# Patient Record
Sex: FEMALE | Race: WHITE | HISPANIC OR LATINO | Employment: UNEMPLOYED | URBAN - METROPOLITAN AREA
[De-identification: names, ages, dates, MRNs, and addresses within clinical notes are randomized per-mention and may not be internally consistent; named-entity substitution may affect disease eponyms.]

---
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
## 2021-10-18 ENCOUNTER — HOSPITAL ENCOUNTER (OUTPATIENT)
Facility: MEDICAL CENTER | Age: 21
End: 2021-10-18
Attending: SPECIALIST
Payer: COMMERCIAL

## 2021-10-18 LAB — FIBRONECTIN FETAL SPEC QL: POSITIVE

## 2021-10-18 PROCEDURE — 82731 ASSAY OF FETAL FIBRONECTIN: CPT

## 2021-10-20 ENCOUNTER — HOSPITAL ENCOUNTER (EMERGENCY)
Facility: MEDICAL CENTER | Age: 21
End: 2021-10-20
Attending: SPECIALIST | Admitting: SPECIALIST
Payer: COMMERCIAL

## 2021-10-20 VITALS
HEART RATE: 80 BPM | BODY MASS INDEX: 27.31 KG/M2 | TEMPERATURE: 98.2 F | RESPIRATION RATE: 16 BRPM | OXYGEN SATURATION: 95 % | DIASTOLIC BLOOD PRESSURE: 64 MMHG | WEIGHT: 160 LBS | HEIGHT: 64 IN | SYSTOLIC BLOOD PRESSURE: 103 MMHG

## 2021-10-20 PROCEDURE — 302449 STATCHG TRIAGE ONLY (STATISTIC)

## 2021-10-20 PROCEDURE — 700111 HCHG RX REV CODE 636 W/ 250 OVERRIDE (IP): Performed by: SPECIALIST

## 2021-10-20 PROCEDURE — 96372 THER/PROPH/DIAG INJ SC/IM: CPT

## 2021-10-20 RX ORDER — BETAMETHASONE SODIUM PHOSPHATE AND BETAMETHASONE ACETATE 3; 3 MG/ML; MG/ML
12 INJECTION, SUSPENSION INTRA-ARTICULAR; INTRALESIONAL; INTRAMUSCULAR; SOFT TISSUE EVERY 24 HOURS
Status: DISCONTINUED | OUTPATIENT
Start: 2021-10-20 | End: 2021-10-20 | Stop reason: HOSPADM

## 2021-10-20 RX ADMIN — BETAMETHASONE SODIUM PHOSPHATE AND BETAMETHASONE ACETATE 12 MG: 3; 3 INJECTION, SUSPENSION INTRA-ARTICULAR; INTRALESIONAL; INTRAMUSCULAR at 17:28

## 2021-10-20 ASSESSMENT — PAIN SCALES - GENERAL: PAINLEVEL: 3

## 2021-10-20 NOTE — PROGRESS NOTES
Pt presents from Dr. Connor's office for betamethasone injection. Pt ambulated to [facility] for assessment.      TOCO and EFM applied, VSS. Pt reports some mild/moderate cramping and reports that her belly gets hard from time to time. This has been going on for a month. Difficult to trace baby due to FM heard by RN, RN will reposition for better assessment. Pt did have a positive FFN on the .     173 Dr. Connor updated on pt status, orders for discharge received. Pt to return tomorrow for second dose. Pt to be placed on pelvic rest due to recently diagnosed marginal placenta previa and r/o PTL.     1745 RN at bedside,  labor precautions as well as instruction on pelvic rest and when to return to L&D. Pt verbalized understanding. Pt informed of second steroid shot tomorrow. All questions answered.     1748 Pt discharged home in stable condition.

## 2021-10-21 ENCOUNTER — APPOINTMENT (OUTPATIENT)
Dept: OBGYN | Facility: MEDICAL CENTER | Age: 21
End: 2021-10-21
Attending: SPECIALIST
Payer: COMMERCIAL

## 2021-10-21 ENCOUNTER — HOSPITAL ENCOUNTER (OUTPATIENT)
Facility: MEDICAL CENTER | Age: 21
End: 2021-10-21
Attending: SPECIALIST | Admitting: SPECIALIST
Payer: COMMERCIAL

## 2021-10-21 VITALS
WEIGHT: 160 LBS | TEMPERATURE: 98.4 F | OXYGEN SATURATION: 95 % | DIASTOLIC BLOOD PRESSURE: 57 MMHG | HEIGHT: 64 IN | BODY MASS INDEX: 27.31 KG/M2 | SYSTOLIC BLOOD PRESSURE: 105 MMHG | RESPIRATION RATE: 16 BRPM | HEART RATE: 100 BPM

## 2021-10-21 PROCEDURE — 700111 HCHG RX REV CODE 636 W/ 250 OVERRIDE (IP): Performed by: SPECIALIST

## 2021-10-21 PROCEDURE — 96372 THER/PROPH/DIAG INJ SC/IM: CPT

## 2021-10-21 RX ORDER — BETAMETHASONE SODIUM PHOSPHATE AND BETAMETHASONE ACETATE 3; 3 MG/ML; MG/ML
12 INJECTION, SUSPENSION INTRA-ARTICULAR; INTRALESIONAL; INTRAMUSCULAR; SOFT TISSUE EVERY 24 HOURS
Status: COMPLETED | OUTPATIENT
Start: 2021-10-21 | End: 2021-10-21

## 2021-10-21 RX ADMIN — BETAMETHASONE SODIUM PHOSPHATE AND BETAMETHASONE ACETATE 12 MG: 3; 3 INJECTION, SUSPENSION INTRA-ARTICULAR; INTRALESIONAL; INTRAMUSCULAR at 17:56

## 2021-10-21 NOTE — DISCHARGE INSTRUCTIONS
General Instructions:  · If you think you are in labor, time contractions (lying on your left side) from the beginning of one contraction to the beginning of the next contraction for at least one hour.  · Increase fluid intake: you should consume 10-12 8 oz glasses of non-caffeinated fluid per day.  · Report any pressure or burning on urination to your physician.  · Monitor fetal movement: If you notice an absence or decrease in fetal movement, drink a large glass of water and rest on your side.  If there is no increase in movement, call your physician or go to the hospital for further evaluation.  · Report any sudden, sharp abdominal pain.  · Report any bleeding.  Spotting or pinkish discharge is normal after vaginal exam.  You may also spot after sexual intercourse.    Pre-term Labor (<37 weeks):  Call your physician or return to the hospital if:  · You have painless regular contractions more than 4 in one hour.  · Your water breaks (remember time and color).  · You have menstrual-like cramps, a low dull backache or pressure in your pelvis or back.  · Your baby does not move enough to complete the daily kick count (10 movements in 2 hours).  · Your baby moves much less often than on the days before or you have not felt your baby move all day.  · Please review the MEDICATION LIST section of your AFTER VISIT SUMMARY document.  · Take your medication as prescribed      Activity Restriction During Pregnancy  Your health care provider may recommend specific activity restrictions during pregnancy for a variety of reasons. Activity restriction may require that you limit activities that require great effort, such as exercise, lifting, or sex.  The type of activity restriction will vary for each person, depending on your risk or the problems you are having. Activity restriction may be recommended for a period of time until your baby is delivered.  Why are activity restrictions recommended?  Activity restriction may be  recommended if:  · Your placenta is partially or completely covering the opening of your cervix (placenta previa).  · There is bleeding between the wall of the uterus and the amniotic sac in the first trimester of pregnancy (subchorionic hemorrhage).  · You went into labor too early ( labor).  · You have a history of miscarriage.  · You have a condition that causes high blood pressure during pregnancy (preeclampsia or eclampsia).  · You are pregnant with more than one baby.  · Your baby is not growing well.  What are the risks?  The risks depend on your specific restriction. Strict bed rest has the most physical and emotional risks and is no longer routinely recommended. Risks of strict bed rest include:  · Loss of muscle conditioning from not moving.  · Blood clots.  · Social isolation.  · Depression.  · Loss of income.  Talk with your health care team about activity restriction to decide if it is best for you and your baby. Even if you are having problems during your pregnancy, you may be able to continue with normal levels of activity with careful monitoring by your health care team.  Follow these instructions at home:  If needed, based on your overall health and the health of your baby, your health care provider will decide which type of activity restriction is right for you. Activity restrictions may include:  · Not lifting anything heavier than 10 pounds (4.5 kg).  · Avoiding activities that take a lot of physical effort.  · No lifting or straining.  · Resting in a sitting position or lying down for periods of time during the day.  Pelvic rest may be recommended along with activity restrictions. If pelvic rest is recommended, then:  · Do not have sex, an orgasm, or use sexual stimulation.  · Do not use tampons. Do not douche. Do not put anything into your vagina.  · Do not lift anything that is heavier than 10 lb (4.5 kg).  · Avoid activities that require a lot of effort.  · Avoid any activity in which  your pelvic muscles could become strained, such as squatting.  Questions to ask your health care provider  · Why is my activity being limited?  · How will activity restrictions affect my body?  · Why is rest helpful for me and my baby?  · What activities can I do?  · When can I return to normal activities?  When should I seek immediate medical care?  Seek immediate medical care if you have:  · Vaginal bleeding.  · Vaginal discharge.  · Cramping pain in your lower abdomen.  · Regular contractions.  · A low, dull backache.  Summary  · Your health care provider may recommend specific activity restrictions during pregnancy for a variety of reasons.  · Activity restriction may require that you limit activities such as exercise, lifting, sex, or any other activity that requires great effort.  · Discuss the risks and benefits of activity restriction with your health care team to decide if it is best for you and your baby.  · Contact your health care provider right away if you think you are having contractions, or if you notice vaginal bleeding, discharge, or cramping.  This information is not intended to replace advice given to you by your health care provider. Make sure you discuss any questions you have with your health care provider.  Document Released: 04/13/2012 Document Revised: 04/09/2019 Document Reviewed: 04/09/2019  Elsevier Patient Education © 2020 Elsevier Inc.      Other Instructions:  Please carefully review your entire AFTER VISIT SUMMARY document for all discharge instructions.

## 2021-11-11 ENCOUNTER — HOSPITAL ENCOUNTER (EMERGENCY)
Facility: MEDICAL CENTER | Age: 21
End: 2021-11-11
Payer: COMMERCIAL

## 2021-11-11 ENCOUNTER — HOSPITAL ENCOUNTER (INPATIENT)
Facility: MEDICAL CENTER | Age: 21
LOS: 3 days | End: 2021-11-14
Attending: SPECIALIST | Admitting: SPECIALIST
Payer: COMMERCIAL

## 2021-11-11 ENCOUNTER — ANESTHESIA (OUTPATIENT)
Dept: OBGYN | Facility: MEDICAL CENTER | Age: 21
End: 2021-11-11
Payer: COMMERCIAL

## 2021-11-11 ENCOUNTER — APPOINTMENT (OUTPATIENT)
Dept: RADIOLOGY | Facility: MEDICAL CENTER | Age: 21
End: 2021-11-11
Attending: SPECIALIST
Payer: COMMERCIAL

## 2021-11-11 ENCOUNTER — ANESTHESIA EVENT (OUTPATIENT)
Dept: OBGYN | Facility: MEDICAL CENTER | Age: 21
End: 2021-11-11
Payer: COMMERCIAL

## 2021-11-11 VITALS
DIASTOLIC BLOOD PRESSURE: 85 MMHG | HEART RATE: 118 BPM | RESPIRATION RATE: 18 BRPM | OXYGEN SATURATION: 96 % | SYSTOLIC BLOOD PRESSURE: 108 MMHG | TEMPERATURE: 98.1 F

## 2021-11-11 LAB
ABO + RH BLD: NORMAL
ABO GROUP BLD: NORMAL
APTT PPP: 28.1 SEC (ref 24.7–36)
BARCODED ABORH UBTYP: 9500
BARCODED PRD CODE UBPRD: NORMAL
BARCODED UNIT NUM UBUNT: NORMAL
BASOPHILS # BLD AUTO: 0.3 % (ref 0–1.8)
BASOPHILS # BLD AUTO: 0.6 % (ref 0–1.8)
BASOPHILS # BLD: 0.05 K/UL (ref 0–0.12)
BASOPHILS # BLD: 0.07 K/UL (ref 0–0.12)
BLD GP AB SCN SERPL QL: NORMAL
COMPONENT R 8504R: NORMAL
EOSINOPHIL # BLD AUTO: 0.03 K/UL (ref 0–0.51)
EOSINOPHIL # BLD AUTO: 0.08 K/UL (ref 0–0.51)
EOSINOPHIL NFR BLD: 0.2 % (ref 0–6.9)
EOSINOPHIL NFR BLD: 0.7 % (ref 0–6.9)
ERYTHROCYTE [DISTWIDTH] IN BLOOD BY AUTOMATED COUNT: 40.7 FL (ref 35.9–50)
ERYTHROCYTE [DISTWIDTH] IN BLOOD BY AUTOMATED COUNT: 42.2 FL (ref 35.9–50)
ERYTHROCYTE [DISTWIDTH] IN BLOOD BY AUTOMATED COUNT: 42.4 FL (ref 35.9–50)
FIBRINOGEN PPP-MCNC: 442 MG/DL (ref 215–460)
HCT VFR BLD AUTO: 31.5 % (ref 37–47)
HCT VFR BLD AUTO: 34.3 % (ref 37–47)
HCT VFR BLD AUTO: 36 % (ref 37–47)
HGB BLD-MCNC: 10.9 G/DL (ref 12–16)
HGB BLD-MCNC: 11.8 G/DL (ref 12–16)
HGB BLD-MCNC: 12.3 G/DL (ref 12–16)
IMM GRANULOCYTES # BLD AUTO: 0.05 K/UL (ref 0–0.11)
IMM GRANULOCYTES # BLD AUTO: 0.14 K/UL (ref 0–0.11)
IMM GRANULOCYTES NFR BLD AUTO: 0.4 % (ref 0–0.9)
IMM GRANULOCYTES NFR BLD AUTO: 0.8 % (ref 0–0.9)
INR PPP: 1.01 (ref 0.87–1.13)
LYMPHOCYTES # BLD AUTO: 1.98 K/UL (ref 1–4.8)
LYMPHOCYTES # BLD AUTO: 4.09 K/UL (ref 1–4.8)
LYMPHOCYTES NFR BLD: 11 % (ref 22–41)
LYMPHOCYTES NFR BLD: 33.8 % (ref 22–41)
MCH RBC QN AUTO: 30.2 PG (ref 27–33)
MCH RBC QN AUTO: 30.5 PG (ref 27–33)
MCH RBC QN AUTO: 30.6 PG (ref 27–33)
MCHC RBC AUTO-ENTMCNC: 34.2 G/DL (ref 33.6–35)
MCHC RBC AUTO-ENTMCNC: 34.4 G/DL (ref 33.6–35)
MCHC RBC AUTO-ENTMCNC: 34.6 G/DL (ref 33.6–35)
MCV RBC AUTO: 88.2 FL (ref 81.4–97.8)
MCV RBC AUTO: 88.5 FL (ref 81.4–97.8)
MCV RBC AUTO: 89.1 FL (ref 81.4–97.8)
MONOCYTES # BLD AUTO: 0.58 K/UL (ref 0–0.85)
MONOCYTES # BLD AUTO: 0.72 K/UL (ref 0–0.85)
MONOCYTES NFR BLD AUTO: 3.2 % (ref 0–13.4)
MONOCYTES NFR BLD AUTO: 6 % (ref 0–13.4)
NEUTROPHILS # BLD AUTO: 15.29 K/UL (ref 2–7.15)
NEUTROPHILS # BLD AUTO: 7.09 K/UL (ref 2–7.15)
NEUTROPHILS NFR BLD: 58.5 % (ref 44–72)
NEUTROPHILS NFR BLD: 84.5 % (ref 44–72)
NRBC # BLD AUTO: 0 K/UL
NRBC # BLD AUTO: 0 K/UL
NRBC BLD-RTO: 0 /100 WBC
NRBC BLD-RTO: 0 /100 WBC
PATHOLOGY CONSULT NOTE: NORMAL
PLATELET # BLD AUTO: 219 K/UL (ref 164–446)
PLATELET # BLD AUTO: 255 K/UL (ref 164–446)
PLATELET # BLD AUTO: 266 K/UL (ref 164–446)
PMV BLD AUTO: 10 FL (ref 9–12.9)
PMV BLD AUTO: 10 FL (ref 9–12.9)
PMV BLD AUTO: 9.9 FL (ref 9–12.9)
PRODUCT TYPE UPROD: NORMAL
PROTHROMBIN TIME: 13 SEC (ref 12–14.6)
RBC # BLD AUTO: 3.57 M/UL (ref 4.2–5.4)
RBC # BLD AUTO: 3.85 M/UL (ref 4.2–5.4)
RBC # BLD AUTO: 4.07 M/UL (ref 4.2–5.4)
RH BLD: NORMAL
SARS-COV+SARS-COV-2 AG RESP QL IA.RAPID: NOTDETECTED
SPECIMEN SOURCE: NORMAL
UNIT STATUS USTAT: NORMAL
WBC # BLD AUTO: 12.1 K/UL (ref 4.8–10.8)
WBC # BLD AUTO: 18 K/UL (ref 4.8–10.8)
WBC # BLD AUTO: 18.1 K/UL (ref 4.8–10.8)

## 2021-11-11 PROCEDURE — 86900 BLOOD TYPING SEROLOGIC ABO: CPT

## 2021-11-11 PROCEDURE — 36415 COLL VENOUS BLD VENIPUNCTURE: CPT

## 2021-11-11 PROCEDURE — 770002 HCHG ROOM/CARE - OB PRIVATE (112)

## 2021-11-11 PROCEDURE — P9016 RBC LEUKOCYTES REDUCED: HCPCS

## 2021-11-11 PROCEDURE — 36430 TRANSFUSION BLD/BLD COMPNT: CPT

## 2021-11-11 PROCEDURE — 700111 HCHG RX REV CODE 636 W/ 250 OVERRIDE (IP): Performed by: ANESTHESIOLOGY

## 2021-11-11 PROCEDURE — 85610 PROTHROMBIN TIME: CPT

## 2021-11-11 PROCEDURE — 85027 COMPLETE CBC AUTOMATED: CPT

## 2021-11-11 PROCEDURE — 76815 OB US LIMITED FETUS(S): CPT

## 2021-11-11 PROCEDURE — 160009 HCHG ANES TIME/MIN: Performed by: SPECIALIST

## 2021-11-11 PROCEDURE — 160041 HCHG SURGERY MINUTES - EA ADDL 1 MIN LEVEL 4: Performed by: SPECIALIST

## 2021-11-11 PROCEDURE — 85025 COMPLETE CBC W/AUTO DIFF WBC: CPT

## 2021-11-11 PROCEDURE — 302449 STATCHG TRIAGE ONLY (STATISTIC): Mod: EDC

## 2021-11-11 PROCEDURE — 700102 HCHG RX REV CODE 250 W/ 637 OVERRIDE(OP): Performed by: ANESTHESIOLOGY

## 2021-11-11 PROCEDURE — 700105 HCHG RX REV CODE 258: Performed by: ANESTHESIOLOGY

## 2021-11-11 PROCEDURE — A9270 NON-COVERED ITEM OR SERVICE: HCPCS | Performed by: SPECIALIST

## 2021-11-11 PROCEDURE — 160035 HCHG PACU - 1ST 60 MINS PHASE I: Performed by: SPECIALIST

## 2021-11-11 PROCEDURE — 160002 HCHG RECOVERY MINUTES (STAT): Performed by: SPECIALIST

## 2021-11-11 PROCEDURE — 64488 TAP BLOCK BI INJECTION: CPT | Performed by: SPECIALIST

## 2021-11-11 PROCEDURE — 85730 THROMBOPLASTIN TIME PARTIAL: CPT

## 2021-11-11 PROCEDURE — 160048 HCHG OR STATISTICAL LEVEL 1-5: Performed by: SPECIALIST

## 2021-11-11 PROCEDURE — 160029 HCHG SURGERY MINUTES - 1ST 30 MINS LEVEL 4: Performed by: SPECIALIST

## 2021-11-11 PROCEDURE — A9270 NON-COVERED ITEM OR SERVICE: HCPCS

## 2021-11-11 PROCEDURE — 86901 BLOOD TYPING SEROLOGIC RH(D): CPT

## 2021-11-11 PROCEDURE — 700102 HCHG RX REV CODE 250 W/ 637 OVERRIDE(OP)

## 2021-11-11 PROCEDURE — 700111 HCHG RX REV CODE 636 W/ 250 OVERRIDE (IP)

## 2021-11-11 PROCEDURE — 88305 TISSUE EXAM BY PATHOLOGIST: CPT

## 2021-11-11 PROCEDURE — 700102 HCHG RX REV CODE 250 W/ 637 OVERRIDE(OP): Performed by: SPECIALIST

## 2021-11-11 PROCEDURE — 3E0T3BZ INTRODUCTION OF ANESTHETIC AGENT INTO PERIPHERAL NERVES AND PLEXI, PERCUTANEOUS APPROACH: ICD-10-PCS | Performed by: ANESTHESIOLOGY

## 2021-11-11 PROCEDURE — 700111 HCHG RX REV CODE 636 W/ 250 OVERRIDE (IP): Performed by: SPECIALIST

## 2021-11-11 PROCEDURE — A9270 NON-COVERED ITEM OR SERVICE: HCPCS | Performed by: ANESTHESIOLOGY

## 2021-11-11 PROCEDURE — 87426 SARSCOV CORONAVIRUS AG IA: CPT

## 2021-11-11 PROCEDURE — 86850 RBC ANTIBODY SCREEN: CPT

## 2021-11-11 PROCEDURE — 700101 HCHG RX REV CODE 250: Performed by: ANESTHESIOLOGY

## 2021-11-11 PROCEDURE — 30233N1 TRANSFUSION OF NONAUTOLOGOUS RED BLOOD CELLS INTO PERIPHERAL VEIN, PERCUTANEOUS APPROACH: ICD-10-PCS | Performed by: SPECIALIST

## 2021-11-11 PROCEDURE — 96375 TX/PRO/DX INJ NEW DRUG ADDON: CPT

## 2021-11-11 PROCEDURE — 85384 FIBRINOGEN ACTIVITY: CPT

## 2021-11-11 PROCEDURE — 96374 THER/PROPH/DIAG INJ IV PUSH: CPT

## 2021-11-11 PROCEDURE — 99285 EMERGENCY DEPT VISIT HI MDM: CPT

## 2021-11-11 RX ORDER — MEPERIDINE HYDROCHLORIDE 25 MG/ML
12.5 INJECTION INTRAMUSCULAR; INTRAVENOUS; SUBCUTANEOUS
Status: DISCONTINUED | OUTPATIENT
Start: 2021-11-11 | End: 2021-11-11 | Stop reason: HOSPADM

## 2021-11-11 RX ORDER — MAGNESIUM SULFATE HEPTAHYDRATE 40 MG/ML
1 INJECTION, SOLUTION INTRAVENOUS CONTINUOUS
Status: DISCONTINUED | OUTPATIENT
Start: 2021-11-11 | End: 2021-11-11

## 2021-11-11 RX ORDER — METHYLERGONOVINE MALEATE 0.2 MG/ML
INJECTION INTRAVENOUS
Status: COMPLETED
Start: 2021-11-11 | End: 2021-11-11

## 2021-11-11 RX ORDER — HYDROCODONE BITARTRATE AND ACETAMINOPHEN 5; 325 MG/1; MG/1
1 TABLET ORAL EVERY 4 HOURS PRN
Status: DISCONTINUED | OUTPATIENT
Start: 2021-11-11 | End: 2021-11-14 | Stop reason: HOSPADM

## 2021-11-11 RX ORDER — MAGNESIUM SULFATE HEPTAHYDRATE 40 MG/ML
4 INJECTION, SOLUTION INTRAVENOUS ONCE
Status: COMPLETED | OUTPATIENT
Start: 2021-11-11 | End: 2021-11-11

## 2021-11-11 RX ORDER — ROCURONIUM BROMIDE 10 MG/ML
INJECTION, SOLUTION INTRAVENOUS PRN
Status: DISCONTINUED | OUTPATIENT
Start: 2021-11-11 | End: 2021-11-11 | Stop reason: SURG

## 2021-11-11 RX ORDER — METHYLERGONOVINE MALEATE 0.2 MG/ML
0.2 INJECTION INTRAVENOUS
Status: DISCONTINUED | OUTPATIENT
Start: 2021-11-11 | End: 2021-11-14 | Stop reason: HOSPADM

## 2021-11-11 RX ORDER — SODIUM CHLORIDE, SODIUM LACTATE, POTASSIUM CHLORIDE, CALCIUM CHLORIDE 600; 310; 30; 20 MG/100ML; MG/100ML; MG/100ML; MG/100ML
INJECTION, SOLUTION INTRAVENOUS CONTINUOUS
Status: DISCONTINUED | OUTPATIENT
Start: 2021-11-11 | End: 2021-11-11

## 2021-11-11 RX ORDER — OXYTOCIN 10 [USP'U]/ML
INJECTION, SOLUTION INTRAMUSCULAR; INTRAVENOUS PRN
Status: DISCONTINUED | OUTPATIENT
Start: 2021-11-11 | End: 2021-11-11 | Stop reason: SURG

## 2021-11-11 RX ORDER — PHENYLEPHRINE HCL IN 0.9% NACL 0.5 MG/5ML
SYRINGE (ML) INTRAVENOUS PRN
Status: DISCONTINUED | OUTPATIENT
Start: 2021-11-11 | End: 2021-11-11 | Stop reason: SURG

## 2021-11-11 RX ORDER — CITRIC ACID/SODIUM CITRATE 334-500MG
30 SOLUTION, ORAL ORAL ONCE
Status: DISCONTINUED | OUTPATIENT
Start: 2021-11-11 | End: 2021-11-11 | Stop reason: HOSPADM

## 2021-11-11 RX ORDER — LIDOCAINE HYDROCHLORIDE 20 MG/ML
INJECTION, SOLUTION EPIDURAL; INFILTRATION; INTRACAUDAL; PERINEURAL PRN
Status: DISCONTINUED | OUTPATIENT
Start: 2021-11-11 | End: 2021-11-11 | Stop reason: SURG

## 2021-11-11 RX ORDER — VITAMIN A ACETATE, BETA CAROTENE, ASCORBIC ACID, CHOLECALCIFEROL, .ALPHA.-TOCOPHEROL ACETATE, DL-, THIAMINE MONONITRATE, RIBOFLAVIN, NIACINAMIDE, PYRIDOXINE HYDROCHLORIDE, FOLIC ACID, CYANOCOBALAMIN, CALCIUM CARBONATE, FERROUS FUMARATE, ZINC OXIDE, CUPRIC OXIDE 3080; 12; 120; 400; 1; 1.84; 3; 20; 22; 920; 25; 200; 27; 10; 2 [IU]/1; UG/1; MG/1; [IU]/1; MG/1; MG/1; MG/1; MG/1; MG/1; [IU]/1; MG/1; MG/1; MG/1; MG/1; MG/1
1 TABLET, FILM COATED ORAL
Status: DISCONTINUED | OUTPATIENT
Start: 2021-11-11 | End: 2021-11-11

## 2021-11-11 RX ORDER — CEFAZOLIN SODIUM 1 G/3ML
INJECTION, POWDER, FOR SOLUTION INTRAMUSCULAR; INTRAVENOUS PRN
Status: DISCONTINUED | OUTPATIENT
Start: 2021-11-11 | End: 2021-11-11 | Stop reason: SURG

## 2021-11-11 RX ORDER — DIPHENHYDRAMINE HYDROCHLORIDE 50 MG/ML
25 INJECTION INTRAMUSCULAR; INTRAVENOUS EVERY 6 HOURS PRN
Status: DISCONTINUED | OUTPATIENT
Start: 2021-11-11 | End: 2021-11-14 | Stop reason: HOSPADM

## 2021-11-11 RX ORDER — SUCCINYLCHOLINE CHLORIDE 20 MG/ML
INJECTION INTRAMUSCULAR; INTRAVENOUS PRN
Status: DISCONTINUED | OUTPATIENT
Start: 2021-11-11 | End: 2021-11-11 | Stop reason: SURG

## 2021-11-11 RX ORDER — ONDANSETRON 4 MG/1
4 TABLET, ORALLY DISINTEGRATING ORAL EVERY 6 HOURS PRN
Status: DISCONTINUED | OUTPATIENT
Start: 2021-11-11 | End: 2021-11-14 | Stop reason: HOSPADM

## 2021-11-11 RX ORDER — HYDROMORPHONE HYDROCHLORIDE 1 MG/ML
INJECTION, SOLUTION INTRAMUSCULAR; INTRAVENOUS; SUBCUTANEOUS PRN
Status: DISCONTINUED | OUTPATIENT
Start: 2021-11-11 | End: 2021-11-11 | Stop reason: SURG

## 2021-11-11 RX ORDER — KETOROLAC TROMETHAMINE 30 MG/ML
INJECTION, SOLUTION INTRAMUSCULAR; INTRAVENOUS PRN
Status: DISCONTINUED | OUTPATIENT
Start: 2021-11-11 | End: 2021-11-11 | Stop reason: SURG

## 2021-11-11 RX ORDER — HALOPERIDOL 5 MG/ML
1 INJECTION INTRAMUSCULAR
Status: DISCONTINUED | OUTPATIENT
Start: 2021-11-11 | End: 2021-11-11 | Stop reason: HOSPADM

## 2021-11-11 RX ORDER — HYDROCODONE BITARTRATE AND ACETAMINOPHEN 10; 325 MG/1; MG/1
1 TABLET ORAL EVERY 4 HOURS PRN
Status: DISCONTINUED | OUTPATIENT
Start: 2021-11-11 | End: 2021-11-14 | Stop reason: HOSPADM

## 2021-11-11 RX ORDER — ONDANSETRON 2 MG/ML
4 INJECTION INTRAMUSCULAR; INTRAVENOUS EVERY 6 HOURS PRN
Status: DISCONTINUED | OUTPATIENT
Start: 2021-11-11 | End: 2021-11-14 | Stop reason: HOSPADM

## 2021-11-11 RX ORDER — ONDANSETRON 2 MG/ML
4 INJECTION INTRAMUSCULAR; INTRAVENOUS EVERY 6 HOURS PRN
Status: DISCONTINUED | OUTPATIENT
Start: 2021-11-11 | End: 2021-11-11

## 2021-11-11 RX ORDER — SODIUM CHLORIDE, SODIUM LACTATE, POTASSIUM CHLORIDE, CALCIUM CHLORIDE 600; 310; 30; 20 MG/100ML; MG/100ML; MG/100ML; MG/100ML
INJECTION, SOLUTION INTRAVENOUS PRN
Status: DISCONTINUED | OUTPATIENT
Start: 2021-11-11 | End: 2021-11-14 | Stop reason: HOSPADM

## 2021-11-11 RX ORDER — HYDROMORPHONE HYDROCHLORIDE 1 MG/ML
0.1 INJECTION, SOLUTION INTRAMUSCULAR; INTRAVENOUS; SUBCUTANEOUS
Status: DISCONTINUED | OUTPATIENT
Start: 2021-11-11 | End: 2021-11-11 | Stop reason: HOSPADM

## 2021-11-11 RX ORDER — DIPHENHYDRAMINE HCL 25 MG
25 TABLET ORAL EVERY 6 HOURS PRN
Status: DISCONTINUED | OUTPATIENT
Start: 2021-11-11 | End: 2021-11-14 | Stop reason: HOSPADM

## 2021-11-11 RX ORDER — IBUPROFEN 600 MG/1
600 TABLET ORAL EVERY 6 HOURS PRN
Status: DISCONTINUED | OUTPATIENT
Start: 2021-11-11 | End: 2021-11-11

## 2021-11-11 RX ORDER — MORPHINE SULFATE 4 MG/ML
4 INJECTION, SOLUTION INTRAMUSCULAR; INTRAVENOUS
Status: DISCONTINUED | OUTPATIENT
Start: 2021-11-11 | End: 2021-11-14 | Stop reason: HOSPADM

## 2021-11-11 RX ORDER — KETOROLAC TROMETHAMINE 30 MG/ML
30 INJECTION, SOLUTION INTRAMUSCULAR; INTRAVENOUS EVERY 6 HOURS
Status: COMPLETED | OUTPATIENT
Start: 2021-11-11 | End: 2021-11-12

## 2021-11-11 RX ORDER — OXYCODONE HCL 5 MG/5 ML
10 SOLUTION, ORAL ORAL
Status: COMPLETED | OUTPATIENT
Start: 2021-11-11 | End: 2021-11-11

## 2021-11-11 RX ORDER — ONDANSETRON 2 MG/ML
4 INJECTION INTRAMUSCULAR; INTRAVENOUS
Status: DISCONTINUED | OUTPATIENT
Start: 2021-11-11 | End: 2021-11-11 | Stop reason: HOSPADM

## 2021-11-11 RX ORDER — BUPIVACAINE HYDROCHLORIDE 2.5 MG/ML
INJECTION, SOLUTION EPIDURAL; INFILTRATION; INTRACAUDAL PRN
Status: DISCONTINUED | OUTPATIENT
Start: 2021-11-11 | End: 2021-11-11 | Stop reason: SURG

## 2021-11-11 RX ORDER — SODIUM CHLORIDE, SODIUM GLUCONATE, SODIUM ACETATE, POTASSIUM CHLORIDE AND MAGNESIUM CHLORIDE 526; 502; 368; 37; 30 MG/100ML; MG/100ML; MG/100ML; MG/100ML; MG/100ML
1500 INJECTION, SOLUTION INTRAVENOUS ONCE
Status: COMPLETED | OUTPATIENT
Start: 2021-11-11 | End: 2021-11-11

## 2021-11-11 RX ORDER — IBUPROFEN 600 MG/1
600 TABLET ORAL EVERY 6 HOURS PRN
Status: DISCONTINUED | OUTPATIENT
Start: 2021-11-12 | End: 2021-11-14 | Stop reason: HOSPADM

## 2021-11-11 RX ORDER — MISOPROSTOL 200 UG/1
600 TABLET ORAL
Status: DISCONTINUED | OUTPATIENT
Start: 2021-11-11 | End: 2021-11-14 | Stop reason: HOSPADM

## 2021-11-11 RX ORDER — METOCLOPRAMIDE HYDROCHLORIDE 5 MG/ML
10 INJECTION INTRAMUSCULAR; INTRAVENOUS ONCE
Status: COMPLETED | OUTPATIENT
Start: 2021-11-11 | End: 2021-11-11

## 2021-11-11 RX ORDER — OXYCODONE HCL 5 MG/5 ML
5 SOLUTION, ORAL ORAL
Status: COMPLETED | OUTPATIENT
Start: 2021-11-11 | End: 2021-11-11

## 2021-11-11 RX ORDER — BISACODYL 10 MG
10 SUPPOSITORY, RECTAL RECTAL PRN
Status: DISCONTINUED | OUTPATIENT
Start: 2021-11-11 | End: 2021-11-14 | Stop reason: HOSPADM

## 2021-11-11 RX ORDER — VITAMIN A ACETATE, BETA CAROTENE, ASCORBIC ACID, CHOLECALCIFEROL, .ALPHA.-TOCOPHEROL ACETATE, DL-, THIAMINE MONONITRATE, RIBOFLAVIN, NIACINAMIDE, PYRIDOXINE HYDROCHLORIDE, FOLIC ACID, CYANOCOBALAMIN, CALCIUM CARBONATE, FERROUS FUMARATE, ZINC OXIDE, CUPRIC OXIDE 3080; 12; 120; 400; 1; 1.84; 3; 20; 22; 920; 25; 200; 27; 10; 2 [IU]/1; UG/1; MG/1; [IU]/1; MG/1; MG/1; MG/1; MG/1; MG/1; [IU]/1; MG/1; MG/1; MG/1; MG/1; MG/1
1 TABLET, FILM COATED ORAL
Status: DISCONTINUED | OUTPATIENT
Start: 2021-11-12 | End: 2021-11-14 | Stop reason: HOSPADM

## 2021-11-11 RX ORDER — SIMETHICONE 80 MG
80 TABLET,CHEWABLE ORAL 4 TIMES DAILY PRN
Status: DISCONTINUED | OUTPATIENT
Start: 2021-11-11 | End: 2021-11-14 | Stop reason: HOSPADM

## 2021-11-11 RX ORDER — MISOPROSTOL 200 UG/1
TABLET ORAL
Status: COMPLETED
Start: 2021-11-11 | End: 2021-11-11

## 2021-11-11 RX ORDER — ACETAMINOPHEN 325 MG/1
325 TABLET ORAL EVERY 4 HOURS PRN
Status: DISCONTINUED | OUTPATIENT
Start: 2021-11-11 | End: 2021-11-14 | Stop reason: HOSPADM

## 2021-11-11 RX ORDER — METHYLERGONOVINE MALEATE 0.2 MG/ML
INJECTION INTRAVENOUS PRN
Status: DISCONTINUED | OUTPATIENT
Start: 2021-11-11 | End: 2021-11-11 | Stop reason: SURG

## 2021-11-11 RX ORDER — DOCUSATE SODIUM 100 MG/1
100 CAPSULE, LIQUID FILLED ORAL 2 TIMES DAILY PRN
Status: DISCONTINUED | OUTPATIENT
Start: 2021-11-11 | End: 2021-11-14 | Stop reason: HOSPADM

## 2021-11-11 RX ORDER — CARBOPROST TROMETHAMINE 250 UG/ML
250 INJECTION, SOLUTION INTRAMUSCULAR
Status: DISCONTINUED | OUTPATIENT
Start: 2021-11-11 | End: 2021-11-14 | Stop reason: HOSPADM

## 2021-11-11 RX ORDER — CITRIC ACID/SODIUM CITRATE 334-500MG
SOLUTION, ORAL ORAL
Status: DISCONTINUED
Start: 2021-11-11 | End: 2021-11-11

## 2021-11-11 RX ORDER — HYDROMORPHONE HYDROCHLORIDE 1 MG/ML
0.2 INJECTION, SOLUTION INTRAMUSCULAR; INTRAVENOUS; SUBCUTANEOUS
Status: DISCONTINUED | OUTPATIENT
Start: 2021-11-11 | End: 2021-11-11 | Stop reason: HOSPADM

## 2021-11-11 RX ORDER — ONDANSETRON 2 MG/ML
INJECTION INTRAMUSCULAR; INTRAVENOUS PRN
Status: DISCONTINUED | OUTPATIENT
Start: 2021-11-11 | End: 2021-11-11 | Stop reason: SURG

## 2021-11-11 RX ORDER — HYDROMORPHONE HYDROCHLORIDE 1 MG/ML
0.4 INJECTION, SOLUTION INTRAMUSCULAR; INTRAVENOUS; SUBCUTANEOUS
Status: DISCONTINUED | OUTPATIENT
Start: 2021-11-11 | End: 2021-11-11 | Stop reason: HOSPADM

## 2021-11-11 RX ORDER — METOCLOPRAMIDE HYDROCHLORIDE 5 MG/ML
10 INJECTION INTRAMUSCULAR; INTRAVENOUS EVERY 6 HOURS PRN
Status: DISCONTINUED | OUTPATIENT
Start: 2021-11-11 | End: 2021-11-14 | Stop reason: HOSPADM

## 2021-11-11 RX ORDER — DEXAMETHASONE SODIUM PHOSPHATE 4 MG/ML
INJECTION, SOLUTION INTRA-ARTICULAR; INTRALESIONAL; INTRAMUSCULAR; INTRAVENOUS; SOFT TISSUE PRN
Status: DISCONTINUED | OUTPATIENT
Start: 2021-11-11 | End: 2021-11-11 | Stop reason: SURG

## 2021-11-11 RX ADMIN — MISOPROSTOL 1000 MCG: 200 TABLET ORAL at 09:00

## 2021-11-11 RX ADMIN — PROPOFOL 200 MG: 10 INJECTION, EMULSION INTRAVENOUS at 08:16

## 2021-11-11 RX ADMIN — FENTANYL CITRATE 50 MCG: 50 INJECTION, SOLUTION INTRAMUSCULAR; INTRAVENOUS at 08:40

## 2021-11-11 RX ADMIN — SODIUM CHLORIDE, SODIUM GLUCONATE, SODIUM ACETATE, POTASSIUM CHLORIDE AND MAGNESIUM CHLORIDE: 526; 502; 368; 37; 30 INJECTION, SOLUTION INTRAVENOUS at 08:11

## 2021-11-11 RX ADMIN — Medication 200 MCG: at 08:28

## 2021-11-11 RX ADMIN — SUCCINYLCHOLINE CHLORIDE 100 MG: 20 INJECTION, SOLUTION INTRAMUSCULAR; INTRAVENOUS at 08:16

## 2021-11-11 RX ADMIN — MAGNESIUM SULFATE HEPTAHYDRATE 4 G: 40 INJECTION, SOLUTION INTRAVENOUS at 07:55

## 2021-11-11 RX ADMIN — Medication 200 MCG: at 08:32

## 2021-11-11 RX ADMIN — HYDROCODONE BITARTRATE AND ACETAMINOPHEN 1 TABLET: 10; 325 TABLET ORAL at 17:23

## 2021-11-11 RX ADMIN — FAMOTIDINE 20 MG: 10 INJECTION INTRAVENOUS at 08:00

## 2021-11-11 RX ADMIN — OXYTOCIN 1000 ML: 10 INJECTION, SOLUTION INTRAMUSCULAR; INTRAVENOUS at 09:01

## 2021-11-11 RX ADMIN — KETOROLAC TROMETHAMINE 30 MG: 30 INJECTION, SOLUTION INTRAMUSCULAR at 08:57

## 2021-11-11 RX ADMIN — FENTANYL CITRATE 100 MCG: 50 INJECTION, SOLUTION INTRAMUSCULAR; INTRAVENOUS at 08:41

## 2021-11-11 RX ADMIN — HYDROMORPHONE HYDROCHLORIDE 0.5 MG: 1 INJECTION, SOLUTION INTRAMUSCULAR; INTRAVENOUS; SUBCUTANEOUS at 09:09

## 2021-11-11 RX ADMIN — HYDROMORPHONE HYDROCHLORIDE 0.5 MG: 1 INJECTION, SOLUTION INTRAMUSCULAR; INTRAVENOUS; SUBCUTANEOUS at 08:59

## 2021-11-11 RX ADMIN — KETOROLAC TROMETHAMINE 30 MG: 30 INJECTION, SOLUTION INTRAMUSCULAR; INTRAVENOUS at 22:15

## 2021-11-11 RX ADMIN — FENTANYL CITRATE 100 MCG: 50 INJECTION, SOLUTION INTRAMUSCULAR; INTRAVENOUS at 08:16

## 2021-11-11 RX ADMIN — ROCURONIUM BROMIDE 5 MG: 10 INJECTION, SOLUTION INTRAVENOUS at 08:16

## 2021-11-11 RX ADMIN — KETOROLAC TROMETHAMINE 30 MG: 30 INJECTION, SOLUTION INTRAMUSCULAR; INTRAVENOUS at 15:55

## 2021-11-11 RX ADMIN — BUPIVACAINE HYDROCHLORIDE 30 ML: 2.5 INJECTION, SOLUTION EPIDURAL; INFILTRATION; INTRACAUDAL; PERINEURAL at 08:51

## 2021-11-11 RX ADMIN — METOCLOPRAMIDE 10 MG: 5 INJECTION, SOLUTION INTRAMUSCULAR; INTRAVENOUS at 08:00

## 2021-11-11 RX ADMIN — BUPIVACAINE HYDROCHLORIDE 30 ML: 2.5 INJECTION, SOLUTION EPIDURAL; INFILTRATION; INTRACAUDAL; PERINEURAL at 08:56

## 2021-11-11 RX ADMIN — LIDOCAINE HYDROCHLORIDE 80 MG: 20 INJECTION, SOLUTION EPIDURAL; INFILTRATION; INTRACAUDAL at 08:16

## 2021-11-11 RX ADMIN — CEFAZOLIN 2 G: 330 INJECTION, POWDER, FOR SOLUTION INTRAMUSCULAR; INTRAVENOUS at 08:16

## 2021-11-11 RX ADMIN — OXYTOCIN 5 UNITS: 10 INJECTION, SOLUTION INTRAMUSCULAR; INTRAVENOUS at 08:29

## 2021-11-11 RX ADMIN — DEXAMETHASONE SODIUM PHOSPHATE 8 MG: 4 INJECTION, SOLUTION INTRA-ARTICULAR; INTRALESIONAL; INTRAMUSCULAR; INTRAVENOUS; SOFT TISSUE at 08:57

## 2021-11-11 RX ADMIN — OXYTOCIN 5 UNITS: 10 INJECTION, SOLUTION INTRAMUSCULAR; INTRAVENOUS at 08:31

## 2021-11-11 RX ADMIN — ONDANSETRON 4 MG: 2 INJECTION INTRAMUSCULAR; INTRAVENOUS at 08:57

## 2021-11-11 RX ADMIN — OXYTOCIN 20 UNITS: 10 INJECTION, SOLUTION INTRAMUSCULAR; INTRAVENOUS at 08:23

## 2021-11-11 RX ADMIN — OXYCODONE HYDROCHLORIDE 10 MG: 5 SOLUTION ORAL at 09:25

## 2021-11-11 RX ADMIN — FENTANYL CITRATE 50 MCG: 50 INJECTION INTRAMUSCULAR; INTRAVENOUS at 09:25

## 2021-11-11 RX ADMIN — METHYLERGONOVINE MALEATE 200 MCG: 0.2 INJECTION, SOLUTION INTRAMUSCULAR; INTRAVENOUS at 08:26

## 2021-11-11 ASSESSMENT — PAIN SCALES - GENERAL
PAINLEVEL: 5 - MODERATE PAIN
PAIN_LEVEL: 4

## 2021-11-11 ASSESSMENT — EDINBURGH POSTNATAL DEPRESSION SCALE (EPDS)
I HAVE BEEN ABLE TO LAUGH AND SEE THE FUNNY SIDE OF THINGS: AS MUCH AS I ALWAYS COULD
THE THOUGHT OF HARMING MYSELF HAS OCCURRED TO ME: NEVER
I HAVE FELT SAD OR MISERABLE: NOT VERY OFTEN
I HAVE BLAMED MYSELF UNNECESSARILY WHEN THINGS WENT WRONG: NOT VERY OFTEN
I HAVE BEEN ANXIOUS OR WORRIED FOR NO GOOD REASON: YES, SOMETIMES
I HAVE BEEN SO UNHAPPY THAT I HAVE HAD DIFFICULTY SLEEPING: NOT VERY OFTEN
I HAVE BEEN SO UNHAPPY THAT I HAVE BEEN CRYING: ONLY OCCASIONALLY
THINGS HAVE BEEN GETTING ON TOP OF ME: NO, I HAVE BEEN COPING AS WELL AS EVER
I HAVE FELT SCARED OR PANICKY FOR NO GOOD REASON: NO, NOT AT ALL
I HAVE LOOKED FORWARD WITH ENJOYMENT TO THINGS: AS MUCH AS I EVER DID

## 2021-11-11 ASSESSMENT — LIFESTYLE VARIABLES
ALCOHOL_USE: NO
EVER_SMOKED: NEVER

## 2021-11-11 ASSESSMENT — PATIENT HEALTH QUESTIONNAIRE - PHQ9
2. FEELING DOWN, DEPRESSED, IRRITABLE, OR HOPELESS: NOT AT ALL
SUM OF ALL RESPONSES TO PHQ9 QUESTIONS 1 AND 2: 0
1. LITTLE INTEREST OR PLEASURE IN DOING THINGS: NOT AT ALL

## 2021-11-11 ASSESSMENT — PAIN DESCRIPTION - PAIN TYPE
TYPE: SURGICAL PAIN

## 2021-11-11 NOTE — ANESTHESIA TIME REPORT
Anesthesia Start and Stop Event Times     Date Time Event    2021 0810 Ready for Procedure     0811 Anesthesia Start     0915 Anesthesia Stop        Responsible Staff  21    Name Role Begin End    Domenic Bell M.D. Anesth 0811 0915        Preop Diagnosis (Free Text):  Pre-op Diagnosis     Marginal previa; bleeding        Preop Diagnosis (Codes):  Diagnosis Information     Diagnosis Code(s): Status post primary low transverse  section [Z98.891]        Premium Reason  Non-Premium    Comments: 27 3/7 week pregnacy, bleeding, partial placenta previa

## 2021-11-11 NOTE — PROGRESS NOTES
Patient brought from labor and delivery via gurney  Patient oriented to room and surroundings. 0-10 pain scale and pain management discussed. Fundus firm with light lochia. IV infusing without redness or swelling. Family at bedside.  Patient encouraged to call before getting out of bed until stable.  Patient encouraged to call for any needs.

## 2021-11-11 NOTE — ANESTHESIA PROCEDURE NOTES
Peripheral Block    Date/Time: 11/11/2021 8:46 AM  Performed by: Domenic Bell M.D.  Authorized by: Doemnic Bell M.D.     Patient Location:  OR  Start Time:  11/11/2021 8:46 AM  End Time:  11/11/2021 8:56 AM  Reason for Block: at surgeon's request and post-op pain management ONLY    patient identified, IV checked, site marked, risks and benefits discussed, surgical consent, monitors and equipment checked, pre-op evaluation and timeout performed    Patient Position:  Supine  Prep: ChloraPrep    Monitoring:  Heart rate, continuous pulse ox and cardiac monitor  Block Region:  Trunk  Trunk - Block Type:  Abdominal plane block - TAP block    Laterality:  Bilateral  Procedures: ultrasound guided  Image captured, interpreted and electronically stored.  Strength:  1 %  Dose:  3 ml  Block Type:  Single-shot  Needle Length:  100mm  Needle Gauge:  21 G  Needle Localization:  Ultrasound guidance  Injection Assessment:  Negative aspiration for heme, incremental injection and local visualized surrounding nerve on ultrasound  Evidence of intravascular injection: No     US Guided Transversus Abdominis Plane (TAP) Block   US probe placed at the anterior axillary line between the costal margin and iliac crest in an axial plane. External Oblique, Internal Oblique (IO) and Transversis Abdominis (TA) muscles identified.  Needle inserted anteromedial to probe in an in plane approach and advanced under direct visualization to TAP between IO and TA muscled.  After negative aspiration LA injected with ease and visualized spreading in TAP plane. Printed images on chart.

## 2021-11-11 NOTE — OR SURGEON
Immediate Post OP Note    PreOp Diagnosis: Intrauterine pregnancy at 27 5/7 weeks gestation,  labor, bleeding placenta previa, doubling footling breech presentation.      PostOp Diagnosis: Same; delivered      Procedure(s):   SECTION, PRIMARY - Wound Class: Clean Contaminated    Surgeon(s):  ZACKERY Carias D.O.    Anesthesiologist/Type of Anesthesia:  Anesthesiologist: Domenic Bell M.D./Spinal    Surgical Staff:  Assistant: Kathleen Malone  Circulator: Cristina Nieto R.N.  Scrub Person: Lane Yeager  L&OSEI Circulator Assistant: Wild Newton R.N.; Janessa Rushing RMARIAM; Janina Min, R.NMohan    Specimens removed if any:  Cord gases    Estimated Blood Loss: 800 ccs    Findings: Normal appearing uterus with normal appearing adnexa, baby girl infant with Apgars of 1 and 7 at one and five minutes respectively, placenta delivered spontaneous and intact with 3vc.    Complications: None        2021 8:53 AM Cornelio Connor M.D.

## 2021-11-11 NOTE — ED PROVIDER NOTES
Asked to see patient by Dr. Connor for assistance in management. Patient is a 21yo  at 27w5d with known marginal previa/low-lying placenta who presented with HVB this morning. Per report, large amount of bleeding at home. In the hospital, approvimately 200-300mL evacuated from the vaginal vault and about 2cm dilated.     HR 70-100s, BPs 60-80s/40-60s  Patient appears pare, diaphoretic.   CV mildly tachycardic  Resp: non-labored  Abd: Soft, NT    FHT - repetitive variable decelerations, normal baseline, moderate variability  [place]: q2-4 minutes    -I evaluated the patient at the beside with Dr. Connor. Plan to proceed with immediate delivery by  section.   -Patient previously received betamethasone in October. Given non-reassuring fetal status and maternal bleeding/hemodynamic instability I am concerned the she is having ongoing concealed bleeding.   -IV fluid resuscitation in process  -labs with mild anemia (Hg 11.8 but I suspect has not equilibrated yet); coags pending  -initiate massive transfusion    Simran Cruz M.D.

## 2021-11-11 NOTE — PROGRESS NOTES
Late entry, summary of events    20 y.o.  EDC  (27.5 wks)     Pt presented to L&D c/o heavy vaginal bleeding. Pt covered in blood and was pale, diaphoretic and light-headed. FHTs 145 bpm upon arrival. Contractions palpated approx q2-5 minutes. 2 IVs started, Dr Connor notified and MD on his way. When MD arrived, an US was performed which confirmed a previa and breech position. Pt previously received betamethasone. Order received for blood transfusion and magnesium. Pink box obtained from blood bank - 1 Unit of O- PRBCs administered with two RN verify. SVE by Dr Connor=2-3cm. Dr Cruz called to bedside to consult. Pt and MDs agreed to continue with primary  section. Pt transferred to OR1 and was put under general anesthesia. Delivery of female at 0822. Methergine and cytotec administered. Tap block performed by Dr Bell after C/S. Pt transferred to PACU in stable condition. Order received to obtain another CBC.     Dr Connor updated on lab results and pt status. Order received to transfer pt to PP and pt may go down to NICU via wheelchair to see her baby. Pt transferred to PP in stable condition. Report given to Darvin SMITH.

## 2021-11-11 NOTE — ANESTHESIA PROCEDURE NOTES
Airway    Date/Time: 11/11/2021 8:17 AM  Performed by: Domenic Bell M.D.  Authorized by: Domenic Bell M.D.     Location:  OR  Urgency:  Elective  Indications for Airway Management:  Anesthesia      Spontaneous Ventilation: absent    Sedation Level:  Deep  Preoxygenated: Yes    Patient Position:  Sniffing  Mask Difficulty Assessment:  0 - not attempted  Final Airway Type:  Endotracheal airway  Final Endotracheal Airway:  ETT  Cuffed: Yes    Technique Used for Successful ETT Placement:  Direct laryngoscopy  Devices/Methods Used in Placement:  Intubating stylet and cricoid pressure    Insertion Site:  Oral  Blade Type:  Walker  Laryngoscope Blade/Videolaryngoscope Blade Size:  2  ETT Size (mm):  6.5  Measured from:  Teeth  ETT to Teeth (cm):  22  Placement Verified by: auscultation and capnometry    Cormack-Lehane Classification:  Grade I - full view of glottis  Number of Attempts at Approach:  1

## 2021-11-11 NOTE — CARE PLAN
The patient is Stable - Low risk of patient condition declining or worsening    Shift Goals  Clinical Goals: lochia wdl    Progress made toward(s) clinical / shift goals:  FF@U with light lochia    Patient is not progressing towards the following goals:

## 2021-11-11 NOTE — ANESTHESIA POSTPROCEDURE EVALUATION
Patient: Beti Siegel    Procedure Summary     Date: 21 Room / Location: [facility] OR 01 / SURGERY LABOR AND DELIVERY    Anesthesia Start: 811 Anesthesia Stop: 915    Procedure:  SECTION, PRIMARY (Abdomen) Diagnosis:       Status post primary low transverse  section      (same, del)    Surgeons: Cornelio Connor M.D. Responsible Provider: Domenic Bell M.D.    Anesthesia Type: general ASA Status: 2 - Emergent          Final Anesthesia Type: general  Last vitals  BP   110/65   Temp   97.4   Pulse   68   Resp   16    SpO2   90 %      Anesthesia Post Evaluation    Patient location during evaluation: PACU  Patient participation: complete - patient participated  Level of consciousness: awake and alert  Pain score: 4    Airway patency: patent  Anesthetic complications: no  Cardiovascular status: hemodynamically stable  Respiratory status: acceptable  Hydration status: euvolemic    PONV: none          No complications documented.

## 2021-11-11 NOTE — H&P
DATE OF ADMISSION:  2021     REASON FOR ADMISSION:  Heavy vaginal bleeding associated with a placenta   previa.     HISTORY OF PRESENT ILLNESS:  This is a 20-year-old  3, para 1,   presenting at 27 and 5/7th weeks' gestation with a known marginal previa, low   lying placenta with heavy vaginal bleeding on the day of presentation.  Of   note, the patient states that she was awoken in the morning to go to the   bathroom, she voided.  She noted no bleeding.  She returned back to bed   followed by heavy gush of fluid, which she had presumed, which was urinary   incontinence.  She turned on the light and noted large pools of blood and   subsequently was taken to the hospital.  Upon arrival to labor and delivery,   the patient did have approximately 200-300 mL evacuated from the vaginal   vault, was 2-3 cm dilated.  She appeared ashen and pale.  Her blood pressures   were in the 60s-80s systolic and 40s-60s diastolic, pulse anywhere between   70s-100s, appeared pale and diaphoretic.  Fetal heart rate tracing showed   repetitive variable decelerations, normal baseline with moderate variability,   mary approximately every 2-4 minutes and suspected that she was   actively laboring with a bleeding placenta previa.  The patient was counseled   and wished to proceed forward with a primary low transverse  section   given double footling breech presentation.  She was given 1 unit of O negative   blood given the active bleeding along with her clinical picture hypotension   and more than likely greater than 500 mL estimated blood loss with possibility   of ongoing bleeding, difficult to see on ultrasound.     PAST MEDICAL HISTORY:  History of pyelonephritis.     PAST SURGICAL HISTORY:  Dilation and curettage in 2019.     OBSTETRICAL HISTORY:  In 2017 at 38 weeks' gestation after 12-hour labor, the   patient had a spontaneous vaginal delivery, 7 pound 7 ounce infant.  In 2019,   she had a spontaneous  .  This is her third pregnancy.  During the   course of this pregnancy, she was noted to have uterine contractions with a   positive fetal fibronectin and approximately 1 month prior to admission, the   patient was given steroids on 10/19 and 10/20.     GYNECOLOGIC HISTORY:  She did have a chlamydial infection during the course of   this pregnancy with a negative test of cure.     SOCIAL HISTORY:  She denies use of any alcohol, tobacco or recreational drug   use.     MEDICATIONS:  Prenatal vitamins.     ALLERGIES:  No known drug allergies.     PHYSICAL EXAMINATION:  VITAL SIGNS: She is afebrile, blood pressure and pulse are as stated above.  HEART:  Slightly tachycardic.  GENERAL:  She does appear pale and diaphoretic.  ABDOMEN:  Soft, gravid, nontender.  PELVIC:  Sterile vaginal exam is as stated above.  EXTREMITIES:  Nontender.     LABORATORY DATA:  Prenatal care labs were, otherwise, in order.  Fetal heart   rate tracing and toco are as stated above.     ASSESSMENT AND PLAN:  A 20-year-old  3, para 1 at 27 and 5/7th weeks'   gestation with presumed  labor with a bleeding marginal placenta   previa, who is ashen, pale and diaphoretic and hypotensive.  We will plan on   giving the patient 1 unit of O negative blood at this time, drawing labs,   proceeding forward with immediate delivery via  section.  She had   previously received betamethasone in October given an uncertain reassuring   fetal status, maternal bleeding, hemodynamic instability.  For concerns of   ongoing bleeding, plan is to proceed forward with IV fluid resuscitation,   transfusion of 1 unit of O negative packed red blood cells and proceed forward   with delivery at this time.  The patient has been counseled and wished to   proceed forward at this time.        ______________________________  Cornelio Connor MD    /    DD:  2021 08:52  DT:  2021 09:48    Job#:  [id number]

## 2021-11-11 NOTE — OP REPORT
DATE OF SERVICE:  2021     PREOPERATIVE DIAGNOSIS:  Intrauterine pregnancy at 27 and 5/7th weeks'   gestation,  labor, bleeding placenta previa, double footling breech   presentation.     POSTOPERATIVE DIAGNOSIS:  Intrauterine pregnancy at 27 and 5/7th weeks'   gestation,  labor, bleeding placenta previa, double footling breech   presentation, delivered.     PROCEDURE:  Primary low transverse  section.     SURGEON:  Cornelio Connor MD     ASSISTANT:  Antonieta Villar DO     ANESTHESIOLOGIST:  Domenic Bell MD     ANESTHESIA:  General anesthetic.     ESTIMATED BLOOD LOSS FOR THE PROCEDURE:  800 mL     FINDINGS:  Normal-appearing uterus with normal-appearing adnexa.  Baby girl   infant with Apgars of 1 and 7 at 1 and 5 minutes respectively.  Placenta was   delivered spontaneous and intact with 3-vessel cord.  Cord gas pH 7.27, pCO2   of 48.2, pO2 11.1, oxygen saturation 20, bicarbonate 22, and base excess of   -6.     DESCRIPTION OF PROCEDURE:  The patient was taken to the operating room where   general anesthetic was placed without difficulty.  A Pfannenstiel skin   incision was made and carried down sharply to the rectus fascia.  Rectus   fascia was nicked in the midline.  Fascial incision was extended laterally in   both directions using Morel scissors.  Rectus muscles were  in the   midline.  Peritoneum was entered sharply.  Bladder blade was placed.  Uterine   incision was made with use of the scalpel and extended laterally in both   directions.  Upon entry into the uterine cavity, port wine fluid was noted.    Double footling breech presentation.  Infant was delivered using the usual   breech maneuvers.  After a short period of observation, lack of vigorous cry.    Cord was clamped and cut.  Infant was handed to the awaiting NICU staff.    Cord gases were saved and sent.  Cord gas results are as stated above.    Placenta was delivered spontaneous and intact with  3-vessel cord.  Uterus was   then brought in maternal abdomen, wrapped in a wet lap sponge, dry gauze   curetted.  Uterine incision was reapproximated with #1 chromic in a running   locking fashion in one segment.  Additional figure-of-eight suture was used at   the apex of the incision on the right laterally.  Uterus was then placed back   in the maternal abdomen.  Gutters were freed of clots.  Irrigation was then   done of the abdominal cavity.  Excellent hemostasis noted at the uterine   incision.  The peritoneum was then reapproximated with 2-0 Vicryl in running   fashion in one segment.  The rectus muscles were reapproximated at the level   of the pyramidalis insertion and 1 additional rectus muscle suture just   cephalad to that.  Fascia was then reapproximated with 0 Vicryl in running   fashion in one segment.  Subcutaneous tissue was irrigated.  Subcutaneous   tissue was reapproximated with single interrupted sutures using 2-0 Vicryl.    The skin was then closed with a 4-0 subcuticular stitch.  The patient   tolerated the procedure well and was awoken from general anesthesia, was taken   to the recovery room in stable condition.        ______________________________  Cornelio Connor MD    [facility]    DD:  11/11/2021 09:00  DT:  11/11/2021 09:22    Job#:  [id number]

## 2021-11-11 NOTE — ANESTHESIA PREPROCEDURE EVALUATION
Case: [id number] Date/Time: 21    Procedure:  SECTION, PRIMARY (Abdomen)    Anesthesia type: Spinal    Pre-op diagnosis: Marginal previa; bleeding    Location: [facility] OR 01 / SURGERY LABOR AND DELIVERY    Surgeons: Cornelio Connor M.D.          Relevant Problems   No relevant active problems       Physical Exam    Airway   Mallampati: II  TM distance: >3 FB  Neck ROM: full       Cardiovascular - normal exam  Rhythm: regular  Rate: normal  (-) murmur     Dental - normal exam           Pulmonary - normal exam  Breath sounds clear to auscultation     Abdominal    Neurological - normal exam                 Anesthesia Plan    ASA 2- EMERGENT   ASA physical status emergent criteria: acute hemorrhage    Plan - general       Airway plan will be ETT          Induction: intravenous    Postoperative Plan: Postoperative administration of opioids is intended.    Pertinent diagnostic labs and testing reviewed    Informed Consent:    Anesthetic plan and risks discussed with patient.    Use of blood products discussed with: patient whom consented to blood products.

## 2021-11-12 PROCEDURE — 770002 HCHG ROOM/CARE - OB PRIVATE (112)

## 2021-11-12 PROCEDURE — 700102 HCHG RX REV CODE 250 W/ 637 OVERRIDE(OP): Performed by: SPECIALIST

## 2021-11-12 PROCEDURE — 700111 HCHG RX REV CODE 636 W/ 250 OVERRIDE (IP): Performed by: SPECIALIST

## 2021-11-12 PROCEDURE — A9270 NON-COVERED ITEM OR SERVICE: HCPCS | Performed by: SPECIALIST

## 2021-11-12 RX ADMIN — DOCUSATE SODIUM 100 MG: 100 CAPSULE ORAL at 19:50

## 2021-11-12 RX ADMIN — HYDROCODONE BITARTRATE AND ACETAMINOPHEN 1 TABLET: 5; 325 TABLET ORAL at 19:50

## 2021-11-12 RX ADMIN — PRENATAL WITH FERROUS FUM AND FOLIC ACID 1 TABLET: 3080; 920; 120; 400; 22; 1.84; 3; 20; 10; 1; 12; 200; 27; 25; 2 TABLET ORAL at 07:51

## 2021-11-12 RX ADMIN — KETOROLAC TROMETHAMINE 30 MG: 30 INJECTION, SOLUTION INTRAMUSCULAR; INTRAVENOUS at 05:01

## 2021-11-12 RX ADMIN — KETOROLAC TROMETHAMINE 30 MG: 30 INJECTION, SOLUTION INTRAMUSCULAR; INTRAVENOUS at 10:40

## 2021-11-12 RX ADMIN — DOCUSATE SODIUM 100 MG: 100 CAPSULE ORAL at 05:38

## 2021-11-12 ASSESSMENT — PAIN DESCRIPTION - PAIN TYPE
TYPE: ACUTE PAIN
TYPE: SURGICAL PAIN

## 2021-11-12 NOTE — PROGRESS NOTES
Assessment done. Vital signs stable.Fundus firm at umbilicus with light lochia. Dressingover low abdominal incision clean, dry, and intact. No redness, swelling, or drainage noted. Skin well approximated. Patient ambulating with steady gait. Pt claims she will call for any needs or medications

## 2021-11-12 NOTE — PROGRESS NOTES
Progress Note    Subjective:   Doing well. No issues or concerns. Pain well controlled. No sig bleeding or discharge.     Objective Data:  Recent Labs     11/11/21  0730 11/11/21  0957 11/11/21  2226   WBC 12.1* 18.1* 18.0*   RBC 3.85* 4.07* 3.57*   HEMOGLOBIN 11.8* 12.3 10.9*   HEMATOCRIT 34.3* 36.0* 31.5*   MCV 89.1 88.5 88.2   MCH 30.6 30.2 30.5   MCHC 34.4 34.2 34.6   RDW 40.7 42.4 42.2   PLATELETCT 266 219 255   MPV 10.0 9.9 10.0           Vitals:    11/11/21 1825 11/11/21 2200 11/12/21 0200 11/12/21 0600   BP: 110/63 (!) 95/56 103/55 (!) 99/59   Pulse: 82 60 93 84   Resp: 16 16 18 16   Temp: 37.1 °C (98.7 °F) 37.1 °C (98.8 °F) 36.6 °C (97.9 °F) 36.7 °C (98.1 °F)   TempSrc: Temporal Temporal Temporal Temporal   SpO2: 98% 97% 92% 91%   Weight:       Height:         Abdomen: soft non tender fundus with covered appropriately tender incision  Perineum: no sig bleeding or discharge  Ext: non tender calves    Intake/Output Summary (Last 24 hours) at 11/12/2021 0755  Last data filed at 11/12/2021 0200  Gross per 24 hour   Intake 2600 ml   Output 4600 ml   Net -2000 ml       Current Facility-Administered Medications   Medication Dose Route Frequency Provider Last Rate Last Admin   • LR infusion   Intravenous PRN Cornelio Connor M.D.       • PRN oxytocin (PITOCIN) (20 Units/1000 mL) PRN for excessive uterine bleeding - See Admin Instr  125-999 mL/hr Intravenous Once PRN Cornelio Connor M.D.       • miSOPROStol (CYTOTEC) tablet 600 mcg  600 mcg Rectal Once PRN Cornelio Connor M.D.       • methylergonovine (METHERGINE) injection 0.2 mg  0.2 mg Intramuscular Once PRN Cornelio Connor M.D.       • carboPROST (HEMABATE) injection 250 mcg  250 mcg Intramuscular Once PRN Cornelio Connor M.D.       • docusate sodium (COLACE) capsule 100 mg  100 mg Oral BID PRN Cornelio Connor M.D.   100 mg at 11/12/21 0538   • bisacodyl (DULCOLAX) suppository 10 mg  10 mg Rectal PRN Cornelio Connor M.D.       • magnesium hydroxide (MILK  OF MAGNESIA) suspension 30 mL  30 mL Oral Q6HRS PRN Cornelio Connor M.D.       • simethicone (MYLICON) chewable tab 80 mg  80 mg Oral 4X/DAY PRN Cornelio Connor M.D.       • ketorolac (TORADOL) injection 30 mg  30 mg Intravenous Q6HRS Cornelio Connor M.D.   30 mg at 11/12/21 0501    Or   • ketorolac (TORADOL) injection 30 mg  30 mg Intramuscular Q6HRS Cornelio Connor M.D.       • acetaminophen (Tylenol) tablet 325 mg  325 mg Oral Q4HRS PRN Cornelio Connor M.D.       • HYDROcodone-acetaminophen (NORCO) 5-325 MG per tablet 1 Tablet  1 Tablet Oral Q4HRS PRN Cornelio Connor M.D.       • HYDROcodone/acetaminophen (NORCO)  MG per tablet 1 Tablet  1 Tablet Oral Q4HRS PRN Cornelio Connor M.D.   1 Tablet at 11/11/21 1723   • morphine (pf) 4 mg/mL injection 4 mg  4 mg Intramuscular Q3HRS PRN Cornelio Connor M.D.       • ondansetron (ZOFRAN) syringe/vial injection 4 mg  4 mg Intravenous Q6HRS PRN Cornelio Connor M.D.        Or   • ondansetron (ZOFRAN ODT) dispertab 4 mg  4 mg Oral Q6HRS PRN Cornelio Connor M.D.       • metoclopramide (REGLAN) injection 10 mg  10 mg Intravenous Q6HRS PRN Cornelio Connor M.D.       • diphenhydrAMINE (BENADRYL) tablet/capsule 25 mg  25 mg Oral Q6HRS PRN Cornelio Connor M.D.        Or   • diphenhydrAMINE (BENADRYL) injection 25 mg  25 mg Intravenous Q6HRS PRN Cornelio Connor M.D.       • ibuprofen (MOTRIN) tablet 600 mg  600 mg Oral Q6HRS PRN Cornelio Connor M.D.       • prenatal plus vitamin (STUARTNATAL 1+1) 27-1 MG tablet 1 Tablet  1 Tablet Oral Daily-0800 Cornelio Connor M.D.   1 Tablet at 11/12/21 0751       A/P 19 yo S/P Primary LTCS now POD #1. Doing well and will plan to proceed with the usual pp and post operative management. All questions were answered.

## 2021-11-12 NOTE — PROGRESS NOTES
2030 pt doing well, Mat[place] catheter in placed, Assessment done pressure dressing clean dry and intact, bleeding minimal, Denies pain at this time, Encourage mobilization, Needs attended.

## 2021-11-12 NOTE — CARE PLAN
Problem: Altered Physiologic Condition  Goal: Patient physiologically stable as evidenced by normal lochia, palpable uterine involution and vitals within normal limits  Outcome: Progressing     Problem: Bowel Elimination - Post Surgical  Goal: Patient will resume regular bowel sounds and function with no discomfort or distention  Outcome: Progressing     Problem: Early Mobilization - Post Surgery  Goal: Early mobilization post surgery  Outcome: Progressing   The patient is in stable condition    Shift Goals: pain controlled, ambulation, rest  Clinical Goals: management of pain, maintaining uterine good tone, prevent bladder distention  Patient Goals: pain controlled, ambulation, rest  Family Goals: rest    Progress made toward(s) clinical / shift goals:  pt verbalized acceptable level of pain    Patient is not progressing towards the following goals:

## 2021-11-12 NOTE — CONSULTS
Attempted to meet with MOB.  MOB not observed in room.  Will attempt to meet with MOB at a later time when she is back on the unit.      Infant was admitted to the NICU immediately after birth.    1340-  Met with MOB for an initial lactation visit.  MOB delivered her second baby yesterday, 11/11/21, at 0822 at 27.5 weeks gestation.  Infant was admitted to the NICU immediately after delivery.  MOB reported she is pumping every 2-3 hours to the best of her ability and is pumping without concern.  Flange fit assessed and 25 mm flanges appeared appropriate at each breasts.  Reviewed pump settings with MOB and they were: 80 CPM down to 60 after 2 minutes/suction rate of 50%/pumps for 15 minutes.  MOB stated suction rate of 50% was comfortable.  However, when flange fit was assessed, small amount of blood was observed on flange at the right breast.  Suction rate was decreased to 30% and MOB stated this rate was much more comfortable.  No further bleeding was observed.    [employer] referral will be sent to [facility] office on MOB's behalf.  MOB stated she does not have a breast pump for home use.    Pumping Plan:  Pump every 2-3 hours as instructed for 8-10 pumping sessions per 24 hour period.  MOB was advised to perform hand massage and hand expression at each breast following each pumping session for 2-3 minutes.    Demonstrate and taught MOB on how to perform hand expression.    MOB verbalized understanding of how to clean pump parts.    MOB verbalized understanding of all information provided to her and denied having any further questions at this time.  Encouraged MOB to call for lactation assistance as needed.

## 2021-11-12 NOTE — CARE PLAN
The patient is Stable - Low risk of patient condition declining or worsening    Shift Goals  Clinical Goals: ambulate in halls  Patient Goals: pain controlled, ambulation, rest  Family Goals: rest    Progress made toward(s) clinical / shift goals:  pt ambulating in halls with steady gait     Patient is not progressing towards the following goals:

## 2021-11-12 NOTE — DISCHARGE PLANNING
Discharge Planning Assessment Post Partum     Reason for Referral: NICU   Address: [address] [place] [address]  Type of Living Situation: House with FOB, MOB's mother and 4 year old girl (same FOB)  Mom Diagnosis: Pregnancy   Baby Diagnosis: NICU  Primary Language: English      Name of Baby: Fitz Bolden      Mother of the Baby: Beti Siegel ([phone])  Father of the Baby: Kory Bolden   Involved in baby’s care? Yes  Contact Information: [phone]     Prenatal Care: Yes, with [facility]   Mom's PCP: None   PCP for new baby: Pediatrician List Provided      Support System: Yes  Coping/Bonding between mother & baby: Yes  Source of Feeding: Breast Pumping   Supplies for Infant: No, but will be prepared at baby's discharge      Mom's Insurance: Adebayo DECKER is unemployed and is on her father's insurance.  LSW provided MOB with PFA's number to inquire about her Medicaid application from March.    Baby Covered on Insurance: MOB's insurance   Mother Employed/School: No. FOB is employed      Other children in the home/names & ages: Yes, 4 year old girl (same FOB).      Financial Hardship/Income: Denies  Mom's Mental status: Alert and Oriented x 4  Services used prior to admit: Denies     CPS History: Denies  Psychiatric History: Denies. LSW explained the difference between PPD and baby blues and encouraged MOB to reach out if she is experiencing any heightened anxiety or depression.  Domestic Violence History: Denies   Drug/ETOH History: Denies        Resources Provided: Postpartum Resources, Behavioral Health Resources, Children and Family Resources   Referrals Made: Diaper Referral, Social Security/Disability Resources       Clearance for Discharge: Baby is cleared to discharge home with MOB/FOB upon medical clearance.

## 2021-11-13 PROCEDURE — 770002 HCHG ROOM/CARE - OB PRIVATE (112)

## 2021-11-13 PROCEDURE — A9270 NON-COVERED ITEM OR SERVICE: HCPCS | Performed by: SPECIALIST

## 2021-11-13 PROCEDURE — 700102 HCHG RX REV CODE 250 W/ 637 OVERRIDE(OP): Performed by: SPECIALIST

## 2021-11-13 RX ADMIN — PRENATAL WITH FERROUS FUM AND FOLIC ACID 1 TABLET: 3080; 920; 120; 400; 22; 1.84; 3; 20; 10; 1; 12; 200; 27; 25; 2 TABLET ORAL at 08:46

## 2021-11-13 RX ADMIN — IBUPROFEN 600 MG: 600 TABLET, FILM COATED ORAL at 21:37

## 2021-11-13 RX ADMIN — IBUPROFEN 600 MG: 600 TABLET, FILM COATED ORAL at 15:24

## 2021-11-13 RX ADMIN — IBUPROFEN 600 MG: 600 TABLET, FILM COATED ORAL at 00:35

## 2021-11-13 RX ADMIN — HYDROCODONE BITARTRATE AND ACETAMINOPHEN 1 TABLET: 5; 325 TABLET ORAL at 00:35

## 2021-11-13 RX ADMIN — DOCUSATE SODIUM 100 MG: 100 CAPSULE ORAL at 21:37

## 2021-11-13 ASSESSMENT — PAIN DESCRIPTION - PAIN TYPE
TYPE: ACUTE PAIN
TYPE: SURGICAL PAIN
TYPE: ACUTE PAIN;SURGICAL PAIN
TYPE: ACUTE PAIN;SURGICAL PAIN

## 2021-11-13 NOTE — CARE PLAN
The patient is Stable - Low risk of patient condition declining or worsening    Shift Goals  Clinical Goals: ambulate, pain control, rest  Patient Goals: pain controlled, ambulation, rest  Family Goals: rest    Progress made toward(s) clinical / shift goals:  Patient has ambulated today and has attempted to make it down to NICU. Pain tolerable at times and patient does call for PRN meds as needed.     Patient is not progressing towards the following goals:      Problem: Infection - Postpartum  Goal: Postpartum patient will be free of signs and symptoms of infection  Outcome: Progressing  Note: No s/s of infection. Afebrile. Will continue to monitor.      Problem: Early Mobilization - Post Surgery  Goal: Early mobilization post surgery  Outcome: Progressing  Note: Patient has ambulated in room, halls, and occasionally down to visit infant. Encouraged ambulation so long as she is able to tolerate and to take wheel chair if needed.

## 2021-11-13 NOTE — CARE PLAN
The patient is Stable - Low risk of patient condition declining or worsening    Shift Goals  Clinical Goals: ambulating and taking care of self and baby independently  Patient Goals: pain controlled, ambulation, rest  Family Goals: rest    Progress made toward(s) clinical / shift goals:  vss. Ambulating and taking care of self independently    Patient is not progressing towards the following goals:

## 2021-11-13 NOTE — LACTATION NOTE
Met with MOB for a lactation follow up visit.  MOB stated she continues to pump as instructed with a suction rate of 30%.  MOB stated this suction rate remains comfortable at each breast and denied seeing any further bleeding at breasts with pump use.  MOB denied having any lactation questions and/or concerns at this time.    MOB stated she may rent a hospital grade breast pump at discharge.  Informed her they are rented through the [facility] on the weekends.    Pumping plan remains unchanged.  Please see this LC's previous chart note for description of pumping plan.

## 2021-11-13 NOTE — PROGRESS NOTES
Assumed care of patient. Assessment complete. Fundus is firm, with scant to light lochia rubra. No clots at this time. Education given on lochia changes and passing of clots possibly. Per patient has had minimal to no clots. Pain level is now a 5/10, patient stated that when she got up out of bed she felt a burning sensation at incision site. Medicated per MAR. Encouraged patient to ambulate as she can tolerate and to take wheel chair with her to NICU in case she requires it. Bed is locked and in low position. Call light left within reach and encouraged to call for any needs if necessary.

## 2021-11-13 NOTE — PROGRESS NOTES
Progress Note    Subjective:   Doing well. No issues or concerns. Pain well controlled. No sig bleeding or discharge    Objective Data:  Recent Labs     11/11/21  0730 11/11/21  0957 11/11/21  2226   WBC 12.1* 18.1* 18.0*   RBC 3.85* 4.07* 3.57*   HEMOGLOBIN 11.8* 12.3 10.9*   HEMATOCRIT 34.3* 36.0* 31.5*   MCV 89.1 88.5 88.2   MCH 30.6 30.2 30.5   MCHC 34.4 34.2 34.6   RDW 40.7 42.4 42.2   PLATELETCT 266 219 255   MPV 10.0 9.9 10.0           Vitals:    11/12/21 2200 11/13/21 0200 11/13/21 0235 11/13/21 0600   BP: (!) 93/47 (!) 91/48 (!) 92/50 101/58   Pulse: 99 75 82 83   Resp: 16 16 16 16   Temp: 36.9 °C (98.5 °F) 36.5 °C (97.7 °F)  36 °C (96.8 °F)   TempSrc: Temporal Temporal  Temporal   SpO2: 93% 93% 92% 97%   Weight:       Height:         Abdomen: soft non tender fundus  Perinuem: no sig bleeding or discharge  Ext: non tender calves    No intake or output data in the 24 hours ending 11/13/21 0824    Current Facility-Administered Medications   Medication Dose Route Frequency Provider Last Rate Last Admin   • LR infusion   Intravenous PRN Cornelio Connor M.D.       • PRN oxytocin (PITOCIN) (20 Units/1000 mL) PRN for excessive uterine bleeding - See Admin Instr  125-999 mL/hr Intravenous Once PRN Cornelio Connor M.D.       • miSOPROStol (CYTOTEC) tablet 600 mcg  600 mcg Rectal Once PRN Cornelio Connor M.D.       • methylergonovine (METHERGINE) injection 0.2 mg  0.2 mg Intramuscular Once PRN Cornelio Connor M.D.       • carboPROST (HEMABATE) injection 250 mcg  250 mcg Intramuscular Once PRN Cornelio Connor M.D.       • docusate sodium (COLACE) capsule 100 mg  100 mg Oral BID PRN Cornelio Connor M.D.   100 mg at 11/12/21 1950   • bisacodyl (DULCOLAX) suppository 10 mg  10 mg Rectal PRN Cornelio Connor M.D.       • magnesium hydroxide (MILK OF MAGNESIA) suspension 30 mL  30 mL Oral Q6HRS PRN Cornelio Connor M.D.       • simethicone (MYLICON) chewable tab 80 mg  80 mg Oral 4X/DAY PRN Cornelio Connor M.D.        • acetaminophen (Tylenol) tablet 325 mg  325 mg Oral Q4HRS PRN Cornelio Connor M.D.       • HYDROcodone-acetaminophen (NORCO) 5-325 MG per tablet 1 Tablet  1 Tablet Oral Q4HRS PRN Cornelio Connor M.D.   1 Tablet at 11/13/21 0035   • HYDROcodone/acetaminophen (NORCO)  MG per tablet 1 Tablet  1 Tablet Oral Q4HRS PRN Cornelio Connor M.D.   1 Tablet at 11/11/21 1723   • morphine (pf) 4 mg/mL injection 4 mg  4 mg Intramuscular Q3HRS PRN Cornelio Connor M.D.       • ondansetron (ZOFRAN) syringe/vial injection 4 mg  4 mg Intravenous Q6HRS PRN Cornelio Connor M.D.        Or   • ondansetron (ZOFRAN ODT) dispertab 4 mg  4 mg Oral Q6HRS PRN Cornelio Connor M.D.       • metoclopramide (REGLAN) injection 10 mg  10 mg Intravenous Q6HRS PRN Cornelio Connor M.D.       • diphenhydrAMINE (BENADRYL) tablet/capsule 25 mg  25 mg Oral Q6HRS PRN Cornelio Connor M.D.        Or   • diphenhydrAMINE (BENADRYL) injection 25 mg  25 mg Intravenous Q6HRS PRJUAN PABLO Connor M.D.       • ibuprofen (MOTRIN) tablet 600 mg  600 mg Oral Q6HRS PRN Cornelio Connor M.D.   600 mg at 11/13/21 0035   • prenatal plus vitamin (STUARTNATAL 1+1) 27-1 MG tablet 1 Tablet  1 Tablet Oral Daily-0800 Cornelio Connor M.D.   1 Tablet at 11/12/21 0751       A/P S/P Primary LTCS now doing well on POD #2. Continue with routine pp and post operative management. All questions were answered.

## 2021-11-14 VITALS
RESPIRATION RATE: 19 BRPM | OXYGEN SATURATION: 96 % | HEIGHT: 65 IN | BODY MASS INDEX: 26.66 KG/M2 | WEIGHT: 160 LBS | TEMPERATURE: 97.4 F | DIASTOLIC BLOOD PRESSURE: 60 MMHG | HEART RATE: 82 BPM | SYSTOLIC BLOOD PRESSURE: 108 MMHG

## 2021-11-14 PROCEDURE — 700102 HCHG RX REV CODE 250 W/ 637 OVERRIDE(OP): Performed by: SPECIALIST

## 2021-11-14 PROCEDURE — A9270 NON-COVERED ITEM OR SERVICE: HCPCS | Performed by: SPECIALIST

## 2021-11-14 RX ADMIN — PRENATAL WITH FERROUS FUM AND FOLIC ACID 1 TABLET: 3080; 920; 120; 400; 22; 1.84; 3; 20; 10; 1; 12; 200; 27; 25; 2 TABLET ORAL at 09:16

## 2021-11-14 RX ADMIN — IBUPROFEN 600 MG: 600 TABLET, FILM COATED ORAL at 03:04

## 2021-11-14 ASSESSMENT — PAIN DESCRIPTION - PAIN TYPE: TYPE: SURGICAL PAIN

## 2021-11-14 NOTE — CARE PLAN
Problem: Altered Physiologic Condition  Goal: Patient physiologically stable as evidenced by normal lochia, palpable uterine involution and vitals within normal limits  Outcome: Progressing     Problem: Infection - Postpartum  Goal: Postpartum patient will be free of signs and symptoms of infection  Outcome: Progressing     Problem: Bowel Elimination - Post Surgical  Goal: Patient will resume regular bowel sounds and function with no discomfort or distention  Outcome: Progressing     The patient is Stable - Low risk of patient condition declining or worsening    Shift Goals  Clinical Goals: pain control, activity tolerance  Patient Goals: pain controlled, ambulation, rest  Family Goals: rest    Progress made toward(s) clinical / shift goals:  Lochia remains light without clots expressed, pt changing pad and performing camelia care independently. No s/s infection noted on assessment. Pain well controlled with ibuprofen. Passing gas, stool softener given, tolerating regular diet, no N/V.    Patient is not progressing towards the following goals: NA

## 2021-11-14 NOTE — CARE PLAN
Problem: Risk for Excess Fluid Volume  Goal: Patient will demonstrate pulse, blood pressure and neurologic signs within expected ranges and without any respiratory complications  11/14/2021 1329 by Yajaira Nelson R.N.  Outcome: Progressing  Note: Lochia light rubra. Fundus firm.   11/14/2021 1329 by Yajaira Nelson R.N.  Reactivated     Problem: Pain - Standard  Goal: Alleviation of pain or a reduction in pain to the patient’s comfort goal  11/14/2021 1329 by Yajaira Nelson R.N.  Outcome: Progressing  Note: Pt states pain is managed on oral pain medications. Incision HERB with steri strips.   11/14/2021 1329 by Yajaira Nelson R.N.  Reactivated   The patient is Stable - Low risk of patient condition declining or worsening    Shift Goals  Clinical Goals: pain control;   Patient Goals: pain controlled, ambulation, rest  Family Goals: rest    Progress made toward(s) clinical / shift goals:     Patient is not progressing towards the following goals:

## 2021-11-14 NOTE — PROGRESS NOTES
Progress Note    Subjective:   Doing well. No issues or concerns. No vaginal bleeding. BF well.    Objective Data:  Recent Labs     11/11/21 2226   WBC 18.0*   RBC 3.57*   HEMOGLOBIN 10.9*   HEMATOCRIT 31.5*   MCV 88.2   MCH 30.5   MCHC 34.6   RDW 42.2   PLATELETCT 255   MPV 10.0           Vitals:    11/13/21 1751 11/13/21 2200 11/14/21 0200 11/14/21 0600   BP: 103/61 108/61 107/66 108/60   Pulse: 80 74 92 82   Resp: 18 18 19 19   Temp: 36.8 °C (98.3 °F) 37 °C (98.6 °F) 36.8 °C (98.3 °F) 36.3 °C (97.4 °F)   TempSrc: Temporal Temporal Temporal Temporal   SpO2: 92% 98% 95% 96%   Weight:       Height:         ABdomen: soft non tender fundus at umbilicus with covered incision  Perineum: no sig bleeding or discharge  Ext: non tender calves    No intake or output data in the 24 hours ending 11/14/21 1131    Current Facility-Administered Medications   Medication Dose Route Frequency Provider Last Rate Last Admin   • LR infusion   Intravenous PRN Cornelio Connor M.D.       • PRN oxytocin (PITOCIN) (20 Units/1000 mL) PRN for excessive uterine bleeding - See Admin Instr  125-999 mL/hr Intravenous Once PRN Cornelio Connor M.D.       • miSOPROStol (CYTOTEC) tablet 600 mcg  600 mcg Rectal Once PRN Cornelio Connor M.D.       • methylergonovine (METHERGINE) injection 0.2 mg  0.2 mg Intramuscular Once PRN Cornelio Connor M.D.       • carboPROST (HEMABATE) injection 250 mcg  250 mcg Intramuscular Once PRN Cornelio Connor M.D.       • docusate sodium (COLACE) capsule 100 mg  100 mg Oral BID PRN Cornelio Connor M.D.   100 mg at 11/13/21 2137   • bisacodyl (DULCOLAX) suppository 10 mg  10 mg Rectal PRN Cornelio Connor M.D.       • magnesium hydroxide (MILK OF MAGNESIA) suspension 30 mL  30 mL Oral Q6HRS PRN Cornelio Connor M.D.       • simethicone (MYLICON) chewable tab 80 mg  80 mg Oral 4X/DAY PRN Cornelio Connor M.D.       • acetaminophen (Tylenol) tablet 325 mg  325 mg Oral Q4HRS PRN Cornelio Connor M.D.       •  HYDROcodone-acetaminophen (NORCO) 5-325 MG per tablet 1 Tablet  1 Tablet Oral Q4HRS PRN Cornelio Connor M.D.   1 Tablet at 11/13/21 0035   • HYDROcodone/acetaminophen (NORCO)  MG per tablet 1 Tablet  1 Tablet Oral Q4HRS PRN Cornelio Connor M.D.   1 Tablet at 11/11/21 1723   • morphine (pf) 4 mg/mL injection 4 mg  4 mg Intramuscular Q3HRS PRN Cornelio Connor M.D.       • ondansetron (ZOFRAN) syringe/vial injection 4 mg  4 mg Intravenous Q6HRS PRN Cornelio Connor M.D.        Or   • ondansetron (ZOFRAN ODT) dispertab 4 mg  4 mg Oral Q6HRS PRN Cornelio Connor M.D.       • metoclopramide (REGLAN) injection 10 mg  10 mg Intravenous Q6HRS PRJUAN PABLO Connor M.D.       • diphenhydrAMINE (BENADRYL) tablet/capsule 25 mg  25 mg Oral Q6HRS PRJUAN PABLO Connor M.D.        Or   • diphenhydrAMINE (BENADRYL) injection 25 mg  25 mg Intravenous Q6HRS PRJUAN PABLO Connor M.D.       • ibuprofen (MOTRIN) tablet 600 mg  600 mg Oral Q6HRS PRJUAN PABLO Connor M.D.   600 mg at 11/14/21 0304   • prenatal plus vitamin (STUARTNATAL 1+1) 27-1 MG tablet 1 Tablet  1 Tablet Oral Daily-0800 Cornelio Connor M.D.   1 Tablet at 11/14/21 0916       A/P S/P Primary LTCS now doing well on POD #3. Continue with routine pp and post operative management. All questions were answered.

## 2021-11-14 NOTE — LACTATION NOTE
This note was copied from a baby's chart.  LC follow up visit. Mom is now using larger Snappies for collection and storage. She is pumping approx 70 mls. PHILLIP suggested that mother ask for labels from NICU nurse or bedside RN to label appropriately. Mom is not enrolled in [facility]. PHILLIP took information to give to [facility] liaison for a visit to the room in the morning. Mom will follow up and call [facility] [employer] and inquire about an electric breast pump. She plans on renting a [facility]GH from [employer] upon discharge. Mom has no concerns at this time and the pump is working well for her. Reminded mother to take all pump parts with her at [place].  PHILLIP explained their in the [facility]UCI as long baby remains inpatient.

## 2021-11-15 NOTE — DISCHARGE INSTRUCTIONS
PATIENT DISCHARGE EDUCATION INSTRUCTION SHEET  REASONS TO CALL YOUR PEDIATRICIAN  · Projectile or forceful vomiting for more than one feeding  · Unusual rash lasting more than 24 hours  · Very sleepy, difficult to wake up  · Bright yellow or pumpkin colored skin with extreme sleepiness  · Temperature below 97.6 or above 100.4 F rectally  · Feeding problems  · Breathing problems  · Excessive crying with no known cause  · If cord starts to become red, swollen, develops a smell or discharge  · No wet diaper or stool in a 24 hour time period     REASONS TO CALL YOUR OBSTETRICIAN  · Persistent fever, shaking, chills (Temperature higher than 100.4) may indicate you have an infection  · Heavy bleeding: soaking more than 1 pad per hour; Passing clots an egg-sized clot or bigger may mean you have an postpartum hemorrhage  · Foul odor from vagina or bad smelling or discolored discharge or blood  · Breast infection (Mastitis symptoms); breast pain, chills, fever, redness or red streaks, may feel flu like symptoms  · Urinary pain, burning or frequency  · Incision that is not healing, increased redness, swelling, tenderness or pain, or any pus from episiotomy or  site may mean you have an infection  · Redness, swelling, warmth, or painful to touch in the calf area of your leg may mean you have a blood clot  · Severe or intensified depression, thoughts or feelings of wanting to hurt yourself or someone else   · Pain in chest, obstructed breathing or shortness of breath (trouble catching your breath) may mean you are having a postpartum complication. Call your provider immediately   · Headache that does not get better, even after taking medicine, a bad headache with vision changes or pain in the upper right area of your belly may mean you have high blood pressure or post birth preeclampsia. Call your provider immediately    SAFE SLEEP POSITIONING FOR YOUR BABY  The [employer] advises your baby should  be placed on his/her back for Sleeping to reduce the risk of Sudden Infant Death Syndrome (SIDS)  · Baby should sleep by themselves in a crib, portable crib or bassinet  · Baby should not share a bed with his/her parents  · Baby should be placed on his or her back to sleep, night time and at naps  · Baby should sleep on firm mattress with a tightly fitted sheet  · NO couches, waterbeds or anything soft  · Baby's sleep area should not contain any loose blankets, comforters, stuffed animals or any other soft items, (pillows, bumper pads, etc. ...)  · Baby's face should be kept uncovered at all times  · Baby should sleep in a smoke-free environment  · Do not dress baby too warmly to prevent overheating    HAND WASHING  All family and friends should wash their hands:  · Before and after holding the baby  · Before feeding the baby  · After using the restroom or changing the baby's diaper     CARE    TAKING BABY'S TEMPERATURE  · If you feel your baby may have a fever take your baby's temperature per thermometer instructions  · If taking axillary temperature place thermometer under baby's armpit and hold arm close to body  · The most precise and accurate way to take a temperature is rectally  · Turn on the digital thermometer and lubricate the tip of the thermometer with petroleum jelly.  · Lay your baby or child on his or her back, lift his or her thighs, and insert the lubricated thermometer 1/2 to 1 inch (1.3 to 2.5 centimeters) into the rectum  · Call your Pediatrician for temperature lower than 97.6 or greater than 100.4 F rectally    BATHE AND SHAMPOO BABY  · Gently wash baby with a soft cloth using warm water and mild soap - rinse well  · Do not put baby in tub bath until umbilical cord falls off and appears well-healed  · Bathing baby 2-3 times a week might be enough until your baby becomes more mobile. Bathing your baby too much can dry out his or her skin     NAIL CARE  · First recommendation is to keep  them covered to prevent facial scratching  · During the first few weeks,  nails are very soft. Doctors recommend using only a fine emery board. Don't bite or tear your baby's nails. When your baby's nails are stronger, after a few weeks, you can switch to clippers or scissors making sure not to cut too short and nip the quick   · A good time for nail care is while your baby is sleeping and moving less    CORD CARE  · Fold diaper below umbilical cord until cord falls off  · Keep umbilical cord clean and dry  · May see a small amount of crust around the base of the cord. Clean off with mild soap and water and dry             DIAPER AND DRESS BABY  · For baby girls: gently wipe from front to back. Mucous or pink tinged drainage is normal  · For uncircumcised baby boys: do NOT pull back the foreskin to clean the penis. Gently clean with wipes or warm, soapy water  · Dress baby in one more layer of clothing than you are wearing  · Use a hat to protect from sun or cold. NO ties or drawstrings    URINATION AND BOWEL MOVEMENTS  · If formula feeding or when breast milk feeding is established, your baby should wet 6-8 diapers a day and have at least 2 bowel movements a day during the first month  · Bowel movements color and type can vary from day to day    CIRCUMCISION  What to watch out for:  · Foul smelling discharge  · Fever  · Swelling   · Crusty, fluid filled sores  · Trouble urinating   · Persistent bleeding or more than a quarter size spot of blood on his diaper  · Yellow discharge lasting more than a week  · Continue with care procedures until healed or have a visit with your Pediatrician     INFANT FEEDING  · Most newborns feed 8-12 times, every 24 hours. YOU MAY NEED TO WAKE YOUR BABY UP TO FEED  · If breastfeeding, offer both breasts when your baby is showing feeding cues, such as rooting or bringing hand to mouth and sucking  · Common for  babies to feed every 1-3 hours   · Only allow baby to sleep  up to 4 hours in between feeds if baby is feeding well at each feed. Offer breast anytime baby is showing feeding cues and at least every 3 hours  · Follow up with outpatient [facility] for continued breast feeding support    FORMULA FEEDING  · Feed baby formula every 2-3 hours when your baby is showing feeding cues  · Paced bottle feeding will help baby not over eat at each feed     BOTTLE FEEDING   · Paced Bottle Feeding is a method of bottle feeding that allows the infant to be more in control of the feeding pace. This feeding method slows down the flow of milk into the nipple and the mouth, allowing the baby to eat more slowly, and take breaks. Paced feeding reduces the risk of overfeeding that may result in discomfort for the baby   · Hold baby almost upright or slightly reclined position supporting the head and neck  · Use a small nipple for slow-flowing. Slow flow nipple holes help in controlling flow   · Don't force the bottle's nipple into your baby's mouth. Tickle babies lip so baby opens their mouth  · Insert nipple and hold the bottle flat  · Let the baby suck three to four times without milk then tip the bottle just enough to fill the nipple about shelter with milk  · Let baby suck 3-5 continuous swallows, about 20-30 seconds tip the bottle down to give the baby a break  · After a few seconds, when the baby begins to suck again, tip bottle up to allow milk to flow into the nipple  · Continue to Pace feed until baby shows signs of fullness; no longer sucking after a break, turning away or pushing away the nipple   · Bottle propping is not a recommended practice for feeding  · Bottle propping is when you give a baby a bottle by leaning the bottle against a pillow, or other support, rather than holding the baby and the bottle.  · Forces your baby to keep up with the flow, even if the baby is full   · This can increase your baby's risk of choking, ear infections, and tooth decay    BOTTLE  "PREPARATION   · Never feed  formula to your baby, or use formula if the container is dented  · When using ready-to-feed, shake formula containers before opening  · If formula is in a can, clean the lid of any dust, and be sure the can opener is clean  · Formula does not need to be warmed. If you choose to feed warmed formula, do not microwave it. This can cause \"hot spots\" that could burn your baby. Instead, set the filled bottle in a bowl of warm (not boiling) water or hold the bottle under warm tap water. Sprinkle a few drops of formula on the inside of your wrist to make sure it's not too hot  · Measure and pour desired amount of water into baby bottle  · Add unpacked, level scoop(s) of powder to the bottle as directed on formula container. Return dry scoop to can  · Put the cap on the bottle and shake. Move your wrist in a twisting motion helps powder formula mix more quickly and more thoroughly  · Feed or store immediately in refrigerator  · You need to sterilize bottles, nipples, rings, etc., only before the first use    CLEANING BOTTLE  · Use hot, soapy water  · Rinse the bottles and attachments separately and clean with a bottle brush  · If your bottles are labelled  safe, you can alternatively go ahead and wash them in the    · After washing, rinse the bottle parts thoroughly in hot running water to remove any bubbles or soap residue   · Place the parts on a bottle drying rack   · Make sure the bottles are left to drain in a well-ventilated location to ensure that they dry thoroughly  CAR SEAT  For your baby's safety and to comply with [facility] Law you will need to bring a car seat to the hospital before taking your baby home. Please read your car seat instructions before your baby's discharge from the hospital.  · Make sure you place an emergency contact sticker on your baby's car seat with your baby's identifying information  · Car seat should not be placed in the front seat " of a vehicle. The car seat should be placed in the back seat in the rear-facing position.  · Car seat information is available through [employer] Se[employer] at [phone] and also at [email or web address]    MATERNAL CARE     WOUND CARE  Ask your physician for additional care instructions. In general:  ·  Incision:  · May shower and pat incision dry   · Keep the incision clean and dry  · There should not be any opening or pus from the incision  · Continue to walk at home 3 times a day   · Do NOT lift anything heavier than your baby (over 10 pounds)  · Encourage family to help participate in care of the  to allow rest and mom time to heal    · Episiotomy/Laceration  · May use camelia-spray bottle, witch hazel pads and dermaplast spray for comfort  · Use camelia-spray bottle after urinating to cleanse perineal area  · To prevent burning during urination spray camelia-water bottle on labial area   · Pat perineal area dry until episiotomy/laceration is healed  · Continue to use camelia-bottle until bleeding stops as needed  · If have a 2nd degree laceration or greater, a Sitz bath can offer relief from soreness, burning, and inflammation   · Sitz Bath   · Sit in 6 inches of warm water and soak laceration as needed until the laceration heals    VAGINAL CARE AND BLEEDING  · Nothing inside vagina for 6 weeks:   · No sexual intercourse, tampons or douching  · Bleeding may continue for 2-4 weeks. Amount and color may vary  · Soaking 1 pad or more in an hour for several hours is considered heavy bleeding  · Passing large egg sized blood clots can be concerning  · If you feel like you have heavy bleeding or are having increasing amount of blood clots call your Obstetrician immediately  · If you begin feeling faint upon standing, feeling sick to your stomach, have clammy skin, a really fast heartbeat, have chills, start feeling confused, dizzy, sleepy or weak, or feeling like you're going to faint call your Obstetrician  "immediately    HYPERTENSION   Preeclampsia or gestational hypertension are types of high blood pressure that only pregnant women can get. It is important for you to be aware of symptoms to seek early intervention and treatment. If you have any of these symptoms immediately call your Obstetrician    · Vision changes or blurred vision   · Severe headache or pain that is unrelieved with medication and will not go away  · Persistent pain in upper abdomen or shoulder   · Increased swelling of face, feet, or hands  · Difficulty breathing or shortness of breath at rest  · Urinating less than usual    URINATION AND BOWEL MOVEMENTS  · Eating more fiber (bran cereal, fruits, and vegetables) and drinking plenty of fluids will help to avoid constipation  · Urinary frequency and urgency after childbirth is normal  · If you experience any urinary pain, burning or frequency call your provider    BIRTH CONTROL  · It is possible to become pregnant at any time after delivery and while breastfeeding  · Plan to discuss a method of birth control with your physician at your post-delivery follow up visit    POSTPARTUM BLUES  During the first few days after birth, you may experience a sense of the \"blues\" which may include impatience, irritability or even crying. These feelings come and go quickly. However, as many as 1 in 10 women experience emotional symptoms known as postpartum depression.     POSTPARTUM DEPRESSION    May start as early as the second or third day after delivery or take several weeks or months to develop. Symptoms of \"blues\" are present, but are more intense: Crying spells; loss of appetite; feelings of hopelessness or loss of control; fear of touching the baby; over concern or no concern at all about the baby; little or no concern about your own appearance/caring for yourself; and/or inability to sleep or excessive sleeping. Contact your Obstetrician if you are experiencing any of these symptoms     PREVENTING SHAKEN " "BABY  If you are angry or stressed, PUT THE BABY IN THE CRIB, step away, take some deep breaths, and wait until you are calm to care for the baby. DO NOT SHAKE THE BABY. You are not alone, call a supporter for help.  · [facility] 24/7 crisis call line (105-258-2718) or (1-496.617.1254)  · You can also text them, text \"ANSWER\" (951989)      "

## 2021-11-15 NOTE — DISCHARGE SUMMARY
DATE OF ADMISSION:  2021   DATE OF DISCHARGE:  2021     DISCHARGE DIAGNOSES:  1.  Status post primary low transverse  section for an intrauterine   pregnancy at 27 and 5/7th weeks' gestation, complicated by  labor,   bleeding, placenta previa, double footling breech presentation.  2.  Uncomplicated postpartum course.     HISTORY OF PRESENT ILLNESS:  This is a 20-year-old  3, para 1 at 27 and   5/7th weeks' gestation with a known marginal placenta previa/low lying   placenta with heavy vaginal bleeding on the day of presentation.  She was   found to be 2-3 cm dilated.  She had presented via ambulance from home with   complaints of gush of clear fluid on the morning of presentation.  Upon   arrival, her clothes were saturated with blood.  She was examined,   approximately 200-300 mL of blood was evacuated from the vaginal vault.  She   was found to be 2-3 cm dilated.  She appeared ashen and pale.  Her blood   pressures were in the 60s-80s systolic and 40s-60s diastolic and pulse   anywhere between 70s to 100s, appearing as stated above pale and diaphoretic.    Fetal heart rate tracing showed variable decelerations, normal baseline   moderate variability, mary approximately every 2-4 minutes, suspected   to be in active labor with a bleeding placental edge.  The patient was   counseled and after discussion with the patient, plan was to proceed forward   with admission and delivery. Given her hypotension, ashen pale diaphoretic   appearance, a unit of O negative blood was started until her labs were   available and plan to proceed forward with an emergent primary low transverse    section.     Past medical history and physical exam can be found in dictated history and   physical.     ASSESSMENT AND PLAN:  A 20-year-old  3, para 1 at 27 and 5/7th weeks'   gestation with presumed  labor, bleeding marginal placenta previa,   ashen, diaphoretic, hypotensive, as  stated above we will start 1 unit of O   negative blood.  Labs had been drawn.  The patient was taken emergently for    section.  Of note, the patient had been given steroids earlier in the   pregnancy for positive fetal fibronectin with active contractions at that   time. Plan was to start magnesium sulfate and try to get one dose of steroid   in prior to delivery.     HOSPITAL COURSE:  As stated above, the patient was admitted, did undergo   primary low transverse  section emergently.  Estimated blood loss of   800 mL, normal appearing uterus, normal-appearing adnexa.  Baby girl infant   with Apgars of 1 and 7 at 1 and 5 minutes respectively.  Placenta was   delivered spontaneous and intact with 3-vessel cord.  Cord gas pH 7.27, cord   pCO2 of 48.2, pO2 of 11.1, oxygen saturation 20, bicarbonate 22, the base   excess of -6.  Postoperatively, she did well.  She was ambulating, voiding   well, tolerating a regular diet.  Her pain is well controlled.  Her incision   was clean, dry and intact without erythema or induration.  She was afebrile   throughout her entire postoperative course and was felt to be appropriate for   discharge with a discharge plan for  and she had met all discharge   criteria.  Her pain medication Motrin and Norco have been called to the   pharmacy and are available for her to . She was counseled that she will   need to make an appointment for 2 weeks.     DISCHARGE INSTRUCTIONS:  She is to call with any increased temperature greater   than 100.4, increasing vaginal bleeding, abdominal pain unrelieved with any   p.o. pain medication, call with any other questions or concerns.        ______________________________  MD CHRIS Hastinsg/SALONI/GODFREY    DD:  2021 11:45  DT:  2021 16:30    Job#:  [id number]

## 2021-11-15 NOTE — PROGRESS NOTES
Pt being dc'd to home with no needs. Pt discharge scripts given to patient. IV dc'd. Dc instructions discussed with patient.  All questions answered.  Patient  agreeable to dc plan. Pt states she is renting a breast pump. Pt has all belongings at [facility].

## 2023-10-04 ENCOUNTER — HOSPITAL ENCOUNTER (OUTPATIENT)
Facility: MEDICAL CENTER | Age: 23
End: 2023-10-04

## (undated) DEVICE — TUBING D & E COLLECTION SET (50EA/PK)

## (undated) DEVICE — GOWN SURGEONS LARGE - (32/CA)

## (undated) DEVICE — HEAD HOLDER JUNIOR/ADULT

## (undated) DEVICE — KIT ANESTHESIA W/CIRCUIT & 3/LT BAG W/FILTER (20EA/CA)

## (undated) DEVICE — TRAY SRGPRP PVP IOD WT PRP - (20/CA)

## (undated) DEVICE — SET EXTENSION WITH 2 PORTS (48EA/CA) ***PART #2C8610 IS A SUBSTITUTE*****

## (undated) DEVICE — Device

## (undated) DEVICE — DRESSING POST OP BORDER 4 X 10 (5EA/BX)

## (undated) DEVICE — KIT  I.V. START (100EA/CA)

## (undated) DEVICE — WATER IRRIGATION STERILE 1000ML (12EA/CA)

## (undated) DEVICE — CATHETER IV 20 GA X 1-1/4 ---SURG.& SDS ONLY--- (50EA/BX)

## (undated) DEVICE — CANISTER SUCTION 3000ML MECHANICAL FILTER AUTO SHUTOFF MEDI-VAC NONSTERILE LF DISP  (40EA/CA)

## (undated) DEVICE — GLOVE BIOGEL SZ 8 SURGICAL PF LTX - (50PR/BX 4BX/CA)

## (undated) DEVICE — SUTURE 4-0 VICRYL PLUSFS-1 - 27 INCH (36/BX)

## (undated) DEVICE — CLOSURE SKIN STRIP 1/2 X 4 IN - (STERI STRIP) (50/BX 4BX/CA)

## (undated) DEVICE — SET LEADWIRE 5 LEAD BEDSIDE DISPOSABLE ECG (1SET OF 5/EA)

## (undated) DEVICE — TUBE CONNECTING SUCTION - CLEAR PLASTIC STERILE 72 IN (50EA/CA)

## (undated) DEVICE — SUCTION INSTRUMENT YANKAUER BULBOUS TIP W/O VENT (50EA/CA)

## (undated) DEVICE — MASK AIRWAY SIZE 3 UNIQUE SILICON (10/BX)

## (undated) DEVICE — TRAY SPINAL ANESTHESIA NON-SAFETY (10/CA)

## (undated) DEVICE — KIT D & C COLLECTION (10EA/PK)

## (undated) DEVICE — TUBING CLEARLINK DUO-VENT - C-FLO (48EA/CA)

## (undated) DEVICE — STERI STRIP COMPOUND BENZOIN - TINCTURE 0.6ML WITH APPLICATOR (40EA/BX)

## (undated) DEVICE — PENCIL ELECTSURG 10FT HLSTR - WITH BLADE (50EA/CA)

## (undated) DEVICE — CANISTER SUCTION RIGID RED 1500CC (40EA/CA)

## (undated) DEVICE — LACTATED RINGERS INJ 1000 ML - (14EA/CA 60CA/PF)

## (undated) DEVICE — ELECTRODE 850 FOAM ADHESIVE - HYDROGEL RADIOTRNSPRNT (50/PK)

## (undated) DEVICE — PACK ROOM TURNOVER L&D (12/CA)

## (undated) DEVICE — SLEEVE, SEQUENTIAL CALF REG

## (undated) DEVICE — BLANKET UNDERBODY FULL ACCES - (5/CA)

## (undated) DEVICE — NEPTUNE 4 PORT MANIFOLD - (20/PK)

## (undated) DEVICE — SODIUM CHL IRRIGATION 0.9% 1000ML (12EA/CA)

## (undated) DEVICE — ELECTRODE DUAL RETURN W/ CORD - (50/PK)

## (undated) DEVICE — SUTURE 2-0 VICRYL PLUS CT-1 36 (36PK/BX)"

## (undated) DEVICE — PAD SANITARY 11IN MAXI IND WRAPPED  (12EA/PK 24PK/CA)

## (undated) DEVICE — CHLORAPREP 26 ML APPLICATOR - ORANGE TINT(25/CA)

## (undated) DEVICE — SENSOR SPO2 NEO LNCS ADHESIVE (20/BX) SEE USER NOTES

## (undated) DEVICE — SOLUTION PLASMA-LYTE PH 7.4 INJ 1000ML  (14EA/CA)

## (undated) DEVICE — PACK C-SECTION (2EA/CA)

## (undated) DEVICE — SUTURE GENERAL

## (undated) DEVICE — CATHETER IV NON-SAFETY 18 GA X 1 1/4 (50/BX 4BX/CA)

## (undated) DEVICE — SUTURE 0 VICRYL PLUS CT-1 - 36 INCH (36/BX)

## (undated) DEVICE — TAPE CLOTH MEDIPORE 6 INCH - (12RL/CA)

## (undated) DEVICE — SUTURE 1 CHROMIC CTX ETHICON - (36PK/BX)

## (undated) DEVICE — MASK ANESTHESIA ADULT  - (100/CA)

## (undated) DEVICE — PROTECTOR ULNA NERVE - (36PR/CA)